# Patient Record
Sex: FEMALE | Race: BLACK OR AFRICAN AMERICAN | Employment: UNEMPLOYED | URBAN - METROPOLITAN AREA
[De-identification: names, ages, dates, MRNs, and addresses within clinical notes are randomized per-mention and may not be internally consistent; named-entity substitution may affect disease eponyms.]

---

## 2018-03-13 ENCOUNTER — HOSPITAL ENCOUNTER (EMERGENCY)
Age: 48
Discharge: HOME | End: 2018-03-13
Attending: EMERGENCY MEDICINE
Payer: MEDICAID

## 2018-03-13 ENCOUNTER — APPOINTMENT (OUTPATIENT)
Dept: ULTRASOUND IMAGING | Age: 48
End: 2018-03-13
Attending: PHYSICIAN ASSISTANT
Payer: MEDICAID

## 2018-03-13 ENCOUNTER — APPOINTMENT (OUTPATIENT)
Dept: CT IMAGING | Age: 48
End: 2018-03-13
Attending: PHYSICIAN ASSISTANT
Payer: MEDICAID

## 2018-03-13 VITALS
TEMPERATURE: 98.6 F | BODY MASS INDEX: 22.76 KG/M2 | RESPIRATION RATE: 16 BRPM | WEIGHT: 145 LBS | SYSTOLIC BLOOD PRESSURE: 126 MMHG | OXYGEN SATURATION: 100 % | HEART RATE: 72 BPM | HEIGHT: 67 IN | DIASTOLIC BLOOD PRESSURE: 90 MMHG

## 2018-03-13 DIAGNOSIS — N83.202 LEFT OVARIAN CYST: Primary | ICD-10-CM

## 2018-03-13 DIAGNOSIS — R10.31 ABDOMINAL PAIN, RIGHT LOWER QUADRANT: ICD-10-CM

## 2018-03-13 DIAGNOSIS — N30.01 ACUTE CYSTITIS WITH HEMATURIA: ICD-10-CM

## 2018-03-13 DIAGNOSIS — A59.01 TRICHOMONAL VAGINITIS: ICD-10-CM

## 2018-03-13 LAB
ALBUMIN SERPL-MCNC: 3.9 G/DL (ref 3.4–5)
ALBUMIN/GLOB SERPL: 0.8 {RATIO} (ref 0.8–1.7)
ALP SERPL-CCNC: 62 U/L (ref 45–117)
ALT SERPL-CCNC: 35 U/L (ref 13–56)
AMORPH CRY URNS QL MICRO: ABNORMAL
ANION GAP SERPL CALC-SCNC: 4 MMOL/L (ref 3–18)
APPEARANCE UR: ABNORMAL
AST SERPL-CCNC: 31 U/L (ref 15–37)
BACTERIA URNS QL MICRO: ABNORMAL /HPF
BASOPHILS # BLD: 0 K/UL (ref 0–0.06)
BASOPHILS NFR BLD: 0 % (ref 0–2)
BILIRUB SERPL-MCNC: 0.3 MG/DL (ref 0.2–1)
BILIRUB UR QL: NEGATIVE
BUN SERPL-MCNC: 7 MG/DL (ref 7–18)
BUN/CREAT SERPL: 10 (ref 12–20)
CALCIUM SERPL-MCNC: 9.1 MG/DL (ref 8.5–10.1)
CHLORIDE SERPL-SCNC: 103 MMOL/L (ref 100–108)
CO2 SERPL-SCNC: 31 MMOL/L (ref 21–32)
COLOR UR: YELLOW
CREAT SERPL-MCNC: 0.67 MG/DL (ref 0.6–1.3)
DIFFERENTIAL METHOD BLD: NORMAL
EOSINOPHIL # BLD: 0.2 K/UL (ref 0–0.4)
EOSINOPHIL NFR BLD: 4 % (ref 0–5)
EPITH CASTS URNS QL MICRO: ABNORMAL /LPF (ref 0–5)
ERYTHROCYTE [DISTWIDTH] IN BLOOD BY AUTOMATED COUNT: 12.6 % (ref 11.6–14.5)
GLOBULIN SER CALC-MCNC: 4.9 G/DL (ref 2–4)
GLUCOSE SERPL-MCNC: 94 MG/DL (ref 74–99)
GLUCOSE UR STRIP.AUTO-MCNC: NEGATIVE MG/DL
HCG UR QL: NEGATIVE
HCT VFR BLD AUTO: 41.2 % (ref 35–45)
HGB BLD-MCNC: 14.2 G/DL (ref 12–16)
HGB UR QL STRIP: ABNORMAL
KETONES UR QL STRIP.AUTO: NEGATIVE MG/DL
LEUKOCYTE ESTERASE UR QL STRIP.AUTO: ABNORMAL
LIPASE SERPL-CCNC: 221 U/L (ref 73–393)
LYMPHOCYTES # BLD: 1.8 K/UL (ref 0.9–3.6)
LYMPHOCYTES NFR BLD: 39 % (ref 21–52)
MCH RBC QN AUTO: 30.8 PG (ref 24–34)
MCHC RBC AUTO-ENTMCNC: 34.5 G/DL (ref 31–37)
MCV RBC AUTO: 89.4 FL (ref 74–97)
MONOCYTES # BLD: 0.5 K/UL (ref 0.05–1.2)
MONOCYTES NFR BLD: 10 % (ref 3–10)
MUCOUS THREADS URNS QL MICRO: ABNORMAL /LPF
NEUTS SEG # BLD: 2.2 K/UL (ref 1.8–8)
NEUTS SEG NFR BLD: 47 % (ref 40–73)
NITRITE UR QL STRIP.AUTO: NEGATIVE
PH UR STRIP: 8 [PH] (ref 5–8)
PLATELET # BLD AUTO: 242 K/UL (ref 135–420)
PMV BLD AUTO: 10.1 FL (ref 9.2–11.8)
POTASSIUM SERPL-SCNC: 3.7 MMOL/L (ref 3.5–5.5)
PROT SERPL-MCNC: 8.8 G/DL (ref 6.4–8.2)
PROT UR STRIP-MCNC: 30 MG/DL
RBC # BLD AUTO: 4.61 M/UL (ref 4.2–5.3)
RBC #/AREA URNS HPF: ABNORMAL /HPF (ref 0–5)
SERVICE CMNT-IMP: NORMAL
SODIUM SERPL-SCNC: 138 MMOL/L (ref 136–145)
SP GR UR REFRACTOMETRY: 1.02 (ref 1–1.03)
TRICHOMONAS UR QL MICRO: ABNORMAL
UROBILINOGEN UR QL STRIP.AUTO: 1 EU/DL (ref 0.2–1)
WBC # BLD AUTO: 4.7 K/UL (ref 4.6–13.2)
WBC URNS QL MICRO: ABNORMAL /HPF (ref 0–5)
WET PREP GENITAL: NORMAL

## 2018-03-13 PROCEDURE — 74177 CT ABD & PELVIS W/CONTRAST: CPT

## 2018-03-13 PROCEDURE — 74011250636 HC RX REV CODE- 250/636: Performed by: PHYSICIAN ASSISTANT

## 2018-03-13 PROCEDURE — 96374 THER/PROPH/DIAG INJ IV PUSH: CPT

## 2018-03-13 PROCEDURE — 74011636320 HC RX REV CODE- 636/320: Performed by: EMERGENCY MEDICINE

## 2018-03-13 PROCEDURE — 87491 CHLMYD TRACH DNA AMP PROBE: CPT | Performed by: PHYSICIAN ASSISTANT

## 2018-03-13 PROCEDURE — 87210 SMEAR WET MOUNT SALINE/INK: CPT | Performed by: PHYSICIAN ASSISTANT

## 2018-03-13 PROCEDURE — 74011000250 HC RX REV CODE- 250: Performed by: PHYSICIAN ASSISTANT

## 2018-03-13 PROCEDURE — 96372 THER/PROPH/DIAG INJ SC/IM: CPT

## 2018-03-13 PROCEDURE — 87086 URINE CULTURE/COLONY COUNT: CPT | Performed by: PHYSICIAN ASSISTANT

## 2018-03-13 PROCEDURE — 85025 COMPLETE CBC W/AUTO DIFF WBC: CPT | Performed by: PHYSICIAN ASSISTANT

## 2018-03-13 PROCEDURE — 96361 HYDRATE IV INFUSION ADD-ON: CPT

## 2018-03-13 PROCEDURE — 81001 URINALYSIS AUTO W/SCOPE: CPT | Performed by: PHYSICIAN ASSISTANT

## 2018-03-13 PROCEDURE — 80053 COMPREHEN METABOLIC PANEL: CPT | Performed by: PHYSICIAN ASSISTANT

## 2018-03-13 PROCEDURE — 99285 EMERGENCY DEPT VISIT HI MDM: CPT

## 2018-03-13 PROCEDURE — 76830 TRANSVAGINAL US NON-OB: CPT

## 2018-03-13 PROCEDURE — 74011250637 HC RX REV CODE- 250/637: Performed by: PHYSICIAN ASSISTANT

## 2018-03-13 PROCEDURE — 83690 ASSAY OF LIPASE: CPT | Performed by: PHYSICIAN ASSISTANT

## 2018-03-13 PROCEDURE — 81025 URINE PREGNANCY TEST: CPT

## 2018-03-13 RX ORDER — METRONIDAZOLE 500 MG/1
500 TABLET ORAL 2 TIMES DAILY
Qty: 14 TAB | Refills: 0 | Status: SHIPPED | OUTPATIENT
Start: 2018-03-13 | End: 2018-03-20

## 2018-03-13 RX ORDER — ONDANSETRON 4 MG/1
4 TABLET, ORALLY DISINTEGRATING ORAL
Qty: 12 TAB | Refills: 0 | Status: SHIPPED | OUTPATIENT
Start: 2018-03-13 | End: 2019-01-21

## 2018-03-13 RX ORDER — ONDANSETRON 2 MG/ML
4 INJECTION INTRAMUSCULAR; INTRAVENOUS
Status: COMPLETED | OUTPATIENT
Start: 2018-03-13 | End: 2018-03-13

## 2018-03-13 RX ORDER — CEPHALEXIN 500 MG/1
500 CAPSULE ORAL 2 TIMES DAILY
Qty: 14 CAP | Refills: 0 | Status: SHIPPED | OUTPATIENT
Start: 2018-03-13 | End: 2018-03-20

## 2018-03-13 RX ORDER — HYDROCODONE BITARTRATE AND ACETAMINOPHEN 5; 325 MG/1; MG/1
1-2 TABLET ORAL
Qty: 12 TAB | Refills: 0 | Status: SHIPPED | OUTPATIENT
Start: 2018-03-13 | End: 2019-01-21

## 2018-03-13 RX ORDER — AZITHROMYCIN 250 MG/1
1000 TABLET, FILM COATED ORAL
Status: COMPLETED | OUTPATIENT
Start: 2018-03-13 | End: 2018-03-13

## 2018-03-13 RX ORDER — METRONIDAZOLE 250 MG/1
500 TABLET ORAL
Status: COMPLETED | OUTPATIENT
Start: 2018-03-13 | End: 2018-03-13

## 2018-03-13 RX ADMIN — CEFTRIAXONE SODIUM 250 MG: 250 INJECTION, POWDER, FOR SOLUTION INTRAMUSCULAR; INTRAVENOUS at 12:56

## 2018-03-13 RX ADMIN — ONDANSETRON 4 MG: 2 INJECTION INTRAMUSCULAR; INTRAVENOUS at 12:54

## 2018-03-13 RX ADMIN — AZITHROMYCIN 1000 MG: 250 TABLET, FILM COATED ORAL at 13:05

## 2018-03-13 RX ADMIN — IOPAMIDOL 100 ML: 612 INJECTION, SOLUTION INTRAVENOUS at 13:30

## 2018-03-13 RX ADMIN — SODIUM CHLORIDE 1000 ML: 900 INJECTION, SOLUTION INTRAVENOUS at 12:51

## 2018-03-13 RX ADMIN — METRONIDAZOLE 500 MG: 250 TABLET ORAL at 13:05

## 2018-03-13 NOTE — DISCHARGE INSTRUCTIONS
Functional Ovarian Cyst: Care Instructions  Your Care Instructions    A functional ovarian cyst is a sac that forms on the surface of a woman's ovary during ovulation. The sac holds a maturing egg. Usually the sac goes away after the egg is released. But if the egg is not released, or if the sac closes up after the egg is released, the sac can swell up with fluid and form a cyst.  Functional ovarian cysts are different than ovarian growths caused by other problems, such as cancer. Most functional ovarian cysts cause no symptoms and go away on their own. Some cause mild pain. Others can cause severe pain when they rupture or bleed. Follow-up care is a key part of your treatment and safety. Be sure to make and go to all appointments, and call your doctor if you are having problems. It's also a good idea to know your test results and keep a list of the medicines you take. How can you care for yourself at home? · Use heat, such as a hot water bottle, a heating pad set on low, or a warm bath, to relax tense muscles and relieve cramping. · Be safe with medicines. Take pain medicines exactly as directed. ¨ If the doctor gave you a prescription medicine for pain, take it as prescribed. ¨ If you are not taking a prescription pain medicine, ask your doctor if you can take an over-the-counter medicine. · Avoid constipation. Make sure you drink enough fluids and include fruits, vegetables, and fiber in your diet each day. Constipation does not cause ovarian cysts, but it may make your pelvic pain worse. When should you call for help? Call your doctor now or seek immediate medical care if:  ? · You have severe vaginal bleeding. ? · You have new or worse belly or pelvic pain. ? Watch closely for changes in your health, and be sure to contact your doctor if:  ? · You have unusual vaginal bleeding. ? · You do not get better as expected. Where can you learn more?   Go to http://isacc-nash.info/. Enter P420 in the search box to learn more about \"Functional Ovarian Cyst: Care Instructions. \"  Current as of: October 13, 2016  Content Version: 11.4  © 5058-6356 Healthwise, [employer]. Care instructions adapted under license by [employer] (which disclaims liability or warranty for this information). If you have questions about a medical condition or this instruction, always ask your healthcare professional. [place] [address] any warranty or liability for your use of this information.

## 2018-03-13 NOTE — ED PROVIDER NOTES
EMERGENCY DEPARTMENT HISTORY AND PHYSICAL EXAM    Date: 3/13/2018  Patient Name: Austin Galicia    History of Presenting Illness     Chief Complaint   Patient presents with    Abdominal Pain         History Provided By: Patient    Chief Complaint: RLQ pain  Duration: 1 Days  Timing:  Acute  Location: RLQ  Quality: Cramping and Tightness  Severity: Moderate  Associated Symptoms: vomiting and back pain    Additional History (Context):   10:50 AM  Austin Galicia is a 41 y.o. female with PMHX of tubal pregnancy who presents to the emergency department C/O RLQ pain (cramping and tightness) onset yesterday. Associated sxs include vomiting and back pain. Pt is currently menstruating, 2 weeks earlier then normal. Pt was last sexually active several months ago. No at home tx. Pt denies appendectomy, fever, chills, vaginal discharge, irregular bowel movements and any other sxs or complaints. PCP: Jessie Carbone MD        Past History     Past Medical History:  Past Medical History:   Diagnosis Date    Bladder infection        Past Surgical History:  History reviewed. No pertinent surgical history. Family History:  History reviewed. No pertinent family history. Social History:  Social History   Substance Use Topics    Smoking status: Never Smoker    Smokeless tobacco: Never Used    Alcohol use No       Allergies: Allergies   Allergen Reactions    Phenergan [Promethazine] Itching     \"shakes\"         Review of Systems   Review of Systems   Constitutional: Negative for chills and fever. Respiratory: Negative for cough and shortness of breath. Cardiovascular: Negative for chest pain. Gastrointestinal: Positive for abdominal pain and vomiting. Negative for abdominal distention and nausea. Genitourinary: Positive for vaginal bleeding (on menses). Negative for difficulty urinating, dysuria, flank pain, hematuria, urgency and vaginal discharge. Musculoskeletal: Positive for back pain.  Negative for joint swelling. Skin: Negative for rash. Neurological: Negative for weakness and light-headedness. Hematological: Negative for adenopathy. All other systems reviewed and are negative. Physical Exam     Vitals:    03/13/18 1037 03/13/18 1307 03/13/18 1514   BP: (!) 161/108 (!) 161/95 126/90   Pulse: (!) 58 62 72   Resp: 16 18 16   Temp: 98.6 °F (37 °C)     SpO2: 100% 100% 100%   Weight: 65.8 kg (145 lb)     Height: 5' 7\" (1.702 m)       Physical Exam   Constitutional: She is oriented to person, place, and time. She appears well-developed and well-nourished. No distress. AA female in NAD. Alert. HENT:   Head: Normocephalic and atraumatic. Right Ear: External ear normal.   Left Ear: External ear normal.   Eyes: Conjunctivae are normal.   Neck: Normal range of motion. Cardiovascular: Normal rate, regular rhythm, normal heart sounds and intact distal pulses. Exam reveals no gallop and no friction rub. No murmur heard. Pulmonary/Chest: Effort normal and breath sounds normal. No accessory muscle usage. No tachypnea. No respiratory distress. She has no decreased breath sounds. She has no wheezes. She has no rhonchi. She has no rales. Abdominal: Soft. Normal appearance. She exhibits no ascites and no mass. There is tenderness in the right lower quadrant. There is no rigidity, no rebound, no guarding, no CVA tenderness and no tenderness at McBurney's point. Genitourinary:   Genitourinary Comments: Female chaperone in room. See pelvic procedure note. Verbal consent obtained prior to procedure   Neurological: She is alert and oriented to person, place, and time. Skin: Skin is warm and dry. She is not diaphoretic. Psychiatric: She has a normal mood and affect. Judgment normal.   Nursing note and vitals reviewed. Diagnostic Study Results     Labs -     No results found for this or any previous visit (from the past 12 hour(s)). Radiologic Studies -      US TRANSVAGINAL   Final Result   1. Unremarkable uterus and right ovary.     2. Complex left ovarian nodule (up to 1.5 cm) likely complex ovarian cyst.     3. Trace free pelvic fluid, thought to be physiologic. As read by the radiologist.      CT Results  (Last 48 hours)               03/13/18 1342  CT ABD PELV W CONT Final result    Impression:  IMPRESSION:       1. No acute intra-abdominal or pelvic abnormality demonstrated. The appendix is   visualized in its entirety and is normal in appearance. 2. Scattered colonic diverticula without evidence of diverticulitis. Narrative:  EXAM: CT of the abdomen and pelvis       INDICATION: 74-year-old patient with right lower quadrant abdominal pain       COMPARISON: None. TECHNIQUE: Axial CT imaging of the abdomen and pelvis was performed without oral   and with intravenous contrast. Multiplanar reformats were generated. One or more dose reduction techniques were used on this CT: automated exposure   control, adjustment of the mAs and/or kVp according to patient's size, and   iterative reconstruction techniques. The specific techniques utilized on this CT   exam have been documented in the patient's electronic medical record.   _______________       FINDINGS:       LOWER CHEST: Unremarkable. LIVER, BILIARY: Liver is normal. No biliary dilation. Gallbladder is   unremarkable. PANCREAS: Normal.       SPLEEN: Normal.       ADRENALS: Normal.       KIDNEYS/URETERS/BLADDER: Renal parenchymal enhancement is symmetric. No   hydronephrosis or nephrolithiasis. No distal ureteral or bladder stone. PELVIC ORGANS: Uterus and adnexa have an unremarkable CT appearance       VASCULATURE: Minor aortobiiliac atherosclerotic calcification is present without   evidence of aneurysmal dilatation       LYMPH NODES: No enlarged lymph nodes. GASTROINTESTINAL TRACT: The stomach, small intestine, and large intestine are   normal in course and caliber. No bowel obstruction.  No free intraperitoneal gas. The appendix is visualized in its entirety and is normal. Scattered colonic   diverticula are present without evidence of diverticulitis. BONES: No acute or aggressive osseous abnormalities identified. OTHER: None.       _______________    As read by the radiologist.            CXR Results  (Last 48 hours)    None          Medications given in the ED-  Medications   sodium chloride 0.9 % bolus infusion 1,000 mL (0 mL IntraVENous IV Completed 3/13/18 1351)   ondansetron (ZOFRAN) injection 4 mg (4 mg IntraVENous Given 3/13/18 1254)   cefTRIAXone (ROCEPHIN) 250 mg in lidocaine (XYLOCAINE) 10 mg/mL (1 %) IM syringe (250 mg IntraMUSCular Given 3/13/18 1256)   azithromycin (ZITHROMAX) tablet 1,000 mg (1,000 mg Oral Given 3/13/18 1305)   metroNIDAZOLE (FLAGYL) tablet 500 mg (500 mg Oral Given 3/13/18 1305)   iopamidol (ISOVUE 300) 61 % contrast injection 100 mL (100 mL IntraVENous Given 3/13/18 1330)         Medical Decision Making   I am the first provider for this patient. I reviewed the vital signs, available nursing notes, past medical history, past surgical history, family history and social history. Vital Signs-Reviewed the patient's vital signs. Provider Notes (Medical Decision Making): appy, colitis, diverticulitis, UTI/pyelo, stone, ovarian cyst, torsion, TOA, STI/PID. Doubt dissection    Procedures:  Pelvic Exam  Date/Time: 3/13/2018 11:01 AM  Performed by: PA  Procedure duration:  15 minutes. Documented by: Francy Rojas PA-C. Exam assisted by:  Female chaperone in room. Type of exam performed: bimanual and speculum. External genitalia appearance: normal.    Vaginal exam:  bleeding. Bleeding: moderate  Cervical exam:  minimal cervical motion tenderness and os closed. Specimen(s) collected:  chlamydia, GC and vaginal culture. Bimanual exam:  right adenexal tenderness.     Patient tolerance: Patient tolerated the procedure well with no immediate complications              ED Course:   10:50 PM Initial assessment performed. The patients presenting problems have been discussed, and they are in agreement with the care plan formulated and outlined with them. I have encouraged them to ask questions as they arise throughout their visit. Diagnosis and Disposition     Afebrile. CT without evidence of appy. US reveals cyst. + trich. Will tx for GC/C and await cultures. UA c/w UTI. GYN FU. Reasons to RTED discussed with pt. All questions answered. Pt feels comfortable going home at this time. Pt expressed understanding and she agrees with plan. DISCHARGE NOTE:  2:00 PM  Jose So's  results have been reviewed with her. She has been counseled regarding her diagnosis, treatment, and plan. She verbally conveys understanding and agreement of the signs, symptoms, diagnosis, treatment and prognosis and additionally agrees to follow up as discussed. She also agrees with the care-plan and conveys that all of her questions have been answered. I have also provided discharge instructions for her that include: educational information regarding their diagnosis and treatment, and list of reasons why they would want to return to the ED prior to their follow-up appointment, should her condition change. She has been provided with education for proper emergency department utilization. CLINICAL IMPRESSION:    1. Left ovarian cyst    2. Abdominal pain, right lower quadrant    3. Trichomonal vaginitis    4. Acute cystitis with hematuria        PLAN:  1. D/C Home  2. Discharge Medication List as of 3/13/2018  2:03 PM      START taking these medications    Details   metroNIDAZOLE (FLAGYL) 500 mg tablet Take 1 Tab by mouth two (2) times a day for 7 days. First dose in ER. Next dose in 12 hours. Indications: trichomoniasis, Print, Disp-14 Tab, R-0      cephALEXin (KEFLEX) 500 mg capsule Take 1 Cap by mouth two (2) times a day for 7 days.  Indications: BACTERIAL URINARY TRACT INFECTION, Print, Disp-14 Cap, R-0      HYDROcodone-acetaminophen (NORCO) 5-325 mg per tablet Take 1-2 Tabs by mouth every four (4) hours as needed for Pain. Max Daily Amount: 12 Tabs., Print, Disp-12 Tab, R-0      ondansetron (ZOFRAN ODT) 4 mg disintegrating tablet Take 1 Tab by mouth every eight (8) hours as needed for Nausea (or vomiting.). Allow to dissolve on tongue, Print, Disp-12 Tab, R-0           3. Follow-up Information     Follow up With Details Comments Contact Info    Yokasta Hensley MD Schedule an appointment as soon as possible for a visit in 2 days or follow up with your  [address] EMERGENCY DEPT  If symptoms worsen [address] [address]  [phone]        _______________________________    Attestations: This note is prepared by Chidi Vanegas , acting as Scribe for [employer]. [employer], ADOLFO:  The scribe's documentation has been prepared under my direction and personally reviewed by me in its entirety.   I confirm that the note above accurately reflects all work, treatment, procedures, and medical decision making performed by me.  _______________________________

## 2018-03-13 NOTE — ED TRIAGE NOTES
Pt states 8/10 RLQ abdominal pain and vomiting onset yesterday. Describes it as cramping and constant, moving makes it worse. States it is unlike her typical cramping pains associated with her period. Pt did start her period yesterday and is soaking 2 pads an hour-states this is unusual for her. Pt unable to keep food and water down. Denies coffee grounds nor any blood in the emesis. Pt states unintentional weight loss over the period of 6 months of approximately 20 lbs. States she has urinary urgency that also started about 6 months ago.

## 2018-03-13 NOTE — ED TRIAGE NOTES
C/o right sided lower abd pain, vomiting, fever and cold sx's since yesterday. Sepsis Screening completed    (  )Patient meets SIRS criteria. ( x )Patient does not meet SIRS criteria.       SIRS Criteria is achieved when two or more of the following are present   Temperature < 96.8°F (36°C) or > 100.9°F (38.3°C)   Heart Rate > 90 beats per minute   Respiratory Rate > 20 breaths per minute   WBC count > 12,000 or <4,000 or > 10% bands

## 2018-03-13 NOTE — ED NOTES
Medicated per MAR. Pt denies vomiting since triage. Pt tolerated medications well. ABD pain 8/10. No further needs expressed, lights dimmed for comfort.

## 2018-03-13 NOTE — LETTER
[facility] [place] 
THE [facility] EMERGENCY DEPT 
[address] [id number] [phone] Work/School Note Date: 3/13/2018 To Whom It May concern: 
 
Ibrahima Bethea was seen and treated today in the emergency room by the following provider(s): 
Attending Provider: Allen Graf MD 
Physician Assistant: Milvia Patton PA-C. Ibrahima Bethea may return to work on 3/15/18. Sincerely, Fany New PA-C

## 2018-03-14 LAB
C TRACH RRNA SPEC QL NAA+PROBE: NEGATIVE
N GONORRHOEA RRNA SPEC QL NAA+PROBE: NEGATIVE
SPECIMEN SOURCE: NORMAL

## 2018-03-15 LAB
BACTERIA SPEC CULT: NORMAL
SERVICE CMNT-IMP: NORMAL

## 2019-01-21 ENCOUNTER — APPOINTMENT (OUTPATIENT)
Dept: ULTRASOUND IMAGING | Age: 49
End: 2019-01-21
Attending: EMERGENCY MEDICINE
Payer: MEDICAID

## 2019-01-21 ENCOUNTER — HOSPITAL ENCOUNTER (EMERGENCY)
Age: 49
Discharge: HOME | End: 2019-01-21
Attending: EMERGENCY MEDICINE
Payer: MEDICAID

## 2019-01-21 ENCOUNTER — APPOINTMENT (OUTPATIENT)
Dept: CT IMAGING | Age: 49
End: 2019-01-21
Attending: EMERGENCY MEDICINE
Payer: MEDICAID

## 2019-01-21 VITALS
WEIGHT: 160 LBS | BODY MASS INDEX: 25.71 KG/M2 | TEMPERATURE: 97.9 F | RESPIRATION RATE: 20 BRPM | HEART RATE: 61 BPM | SYSTOLIC BLOOD PRESSURE: 129 MMHG | HEIGHT: 66 IN | DIASTOLIC BLOOD PRESSURE: 77 MMHG

## 2019-01-21 DIAGNOSIS — K59.00 CONSTIPATION, UNSPECIFIED CONSTIPATION TYPE: ICD-10-CM

## 2019-01-21 DIAGNOSIS — N83.202 LEFT OVARIAN CYST: ICD-10-CM

## 2019-01-21 DIAGNOSIS — R11.10 VOMITING IN ADULT: Primary | ICD-10-CM

## 2019-01-21 LAB
ALBUMIN SERPL-MCNC: 3.5 G/DL (ref 3.4–5)
ALBUMIN/GLOB SERPL: 0.9 {RATIO} (ref 0.8–1.7)
ALP SERPL-CCNC: 51 U/L (ref 45–117)
ALT SERPL-CCNC: 39 U/L (ref 13–56)
ANION GAP SERPL CALC-SCNC: 3 MMOL/L (ref 3–18)
APPEARANCE UR: CLEAR
AST SERPL-CCNC: 31 U/L (ref 15–37)
BACTERIA URNS QL MICRO: ABNORMAL /HPF
BASOPHILS # BLD: 0 K/UL (ref 0–0.1)
BASOPHILS NFR BLD: 0 % (ref 0–2)
BILIRUB SERPL-MCNC: 0.3 MG/DL (ref 0.2–1)
BILIRUB UR QL: NEGATIVE
BUN SERPL-MCNC: 11 MG/DL (ref 7–18)
BUN/CREAT SERPL: 23 (ref 12–20)
CALCIUM SERPL-MCNC: 8.4 MG/DL (ref 8.5–10.1)
CHLORIDE SERPL-SCNC: 110 MMOL/L (ref 100–108)
CO2 SERPL-SCNC: 27 MMOL/L (ref 21–32)
COLOR UR: YELLOW
CREAT SERPL-MCNC: 0.48 MG/DL (ref 0.6–1.3)
DIFFERENTIAL METHOD BLD: ABNORMAL
EOSINOPHIL # BLD: 0.1 K/UL (ref 0–0.4)
EOSINOPHIL NFR BLD: 2 % (ref 0–5)
EPITH CASTS URNS QL MICRO: ABNORMAL /LPF (ref 0–5)
ERYTHROCYTE [DISTWIDTH] IN BLOOD BY AUTOMATED COUNT: 12.8 % (ref 11.6–14.5)
GLOBULIN SER CALC-MCNC: 3.9 G/DL (ref 2–4)
GLUCOSE SERPL-MCNC: 104 MG/DL (ref 74–99)
GLUCOSE UR STRIP.AUTO-MCNC: NEGATIVE MG/DL
HCG SERPL QL: NEGATIVE
HCT VFR BLD AUTO: 37.7 % (ref 35–45)
HEMOCCULT STL QL: NEGATIVE
HGB BLD-MCNC: 13 G/DL (ref 12–16)
HGB UR QL STRIP: ABNORMAL
KETONES UR QL STRIP.AUTO: NEGATIVE MG/DL
LEUKOCYTE ESTERASE UR QL STRIP.AUTO: NEGATIVE
LIPASE SERPL-CCNC: 351 U/L (ref 73–393)
LYMPHOCYTES # BLD: 2.1 K/UL (ref 0.9–3.6)
LYMPHOCYTES NFR BLD: 47 % (ref 21–52)
MCH RBC QN AUTO: 31 PG (ref 24–34)
MCHC RBC AUTO-ENTMCNC: 34.5 G/DL (ref 31–37)
MCV RBC AUTO: 90 FL (ref 74–97)
MONOCYTES # BLD: 0.2 K/UL (ref 0.05–1.2)
MONOCYTES NFR BLD: 5 % (ref 3–10)
NEUTS SEG # BLD: 2.1 K/UL (ref 1.8–8)
NEUTS SEG NFR BLD: 46 % (ref 40–73)
NITRITE UR QL STRIP.AUTO: NEGATIVE
PH UR STRIP: 7 [PH] (ref 5–8)
PLATELET # BLD AUTO: 235 K/UL (ref 135–420)
PMV BLD AUTO: 9.8 FL (ref 9.2–11.8)
POTASSIUM SERPL-SCNC: 3.9 MMOL/L (ref 3.5–5.5)
PROT SERPL-MCNC: 7.4 G/DL (ref 6.4–8.2)
PROT UR STRIP-MCNC: NEGATIVE MG/DL
RBC # BLD AUTO: 4.19 M/UL (ref 4.2–5.3)
RBC #/AREA URNS HPF: ABNORMAL /HPF (ref 0–5)
SODIUM SERPL-SCNC: 140 MMOL/L (ref 136–145)
SP GR UR REFRACTOMETRY: >1.03 (ref 1–1.03)
UROBILINOGEN UR QL STRIP.AUTO: 0.2 EU/DL (ref 0.2–1)
WBC # BLD AUTO: 4.5 K/UL (ref 4.6–13.2)
WBC URNS QL MICRO: ABNORMAL /HPF (ref 0–5)

## 2019-01-21 PROCEDURE — 76830 TRANSVAGINAL US NON-OB: CPT

## 2019-01-21 PROCEDURE — 84703 CHORIONIC GONADOTROPIN ASSAY: CPT

## 2019-01-21 PROCEDURE — 83690 ASSAY OF LIPASE: CPT

## 2019-01-21 PROCEDURE — 96361 HYDRATE IV INFUSION ADD-ON: CPT

## 2019-01-21 PROCEDURE — 99283 EMERGENCY DEPT VISIT LOW MDM: CPT

## 2019-01-21 PROCEDURE — 96374 THER/PROPH/DIAG INJ IV PUSH: CPT

## 2019-01-21 PROCEDURE — 82272 OCCULT BLD FECES 1-3 TESTS: CPT

## 2019-01-21 PROCEDURE — 74011250636 HC RX REV CODE- 250/636: Performed by: EMERGENCY MEDICINE

## 2019-01-21 PROCEDURE — 85025 COMPLETE CBC W/AUTO DIFF WBC: CPT

## 2019-01-21 PROCEDURE — 74011636320 HC RX REV CODE- 636/320: Performed by: EMERGENCY MEDICINE

## 2019-01-21 PROCEDURE — 80053 COMPREHEN METABOLIC PANEL: CPT

## 2019-01-21 PROCEDURE — 74177 CT ABD & PELVIS W/CONTRAST: CPT

## 2019-01-21 PROCEDURE — 81001 URINALYSIS AUTO W/SCOPE: CPT

## 2019-01-21 RX ORDER — METOCLOPRAMIDE HYDROCHLORIDE 5 MG/ML
10 INJECTION INTRAMUSCULAR; INTRAVENOUS
Status: COMPLETED | OUTPATIENT
Start: 2019-01-21 | End: 2019-01-21

## 2019-01-21 RX ORDER — DOCUSATE SODIUM 100 MG/1
100 CAPSULE, LIQUID FILLED ORAL 2 TIMES DAILY
Qty: 60 CAP | Refills: 2 | Status: SHIPPED | OUTPATIENT
Start: 2019-01-21 | End: 2019-04-21

## 2019-01-21 RX ORDER — ONDANSETRON 8 MG/1
8 TABLET, ORALLY DISINTEGRATING ORAL
Qty: 12 TAB | Refills: 0 | OUTPATIENT
Start: 2019-01-21 | End: 2021-10-15

## 2019-01-21 RX ORDER — POLYETHYLENE GLYCOL 3350 17 G/17G
17 POWDER, FOR SOLUTION ORAL 2 TIMES DAILY
Qty: 238 G | Refills: 0 | Status: SHIPPED | OUTPATIENT
Start: 2019-01-21

## 2019-01-21 RX ORDER — IBUPROFEN 800 MG/1
800 TABLET ORAL EVERY 8 HOURS
Qty: 15 TAB | Refills: 0 | Status: SHIPPED | OUTPATIENT
Start: 2019-01-21 | End: 2019-01-26

## 2019-01-21 RX ADMIN — IOPAMIDOL 100 ML: 612 INJECTION, SOLUTION INTRAVENOUS at 10:40

## 2019-01-21 RX ADMIN — SODIUM CHLORIDE 1000 ML: 900 INJECTION, SOLUTION INTRAVENOUS at 09:11

## 2019-01-21 RX ADMIN — METOCLOPRAMIDE 10 MG: 5 INJECTION, SOLUTION INTRAMUSCULAR; INTRAVENOUS at 09:11

## 2019-01-21 NOTE — DISCHARGE INSTRUCTIONS
Patient Education        Nausea and Vomiting: Care Instructions  Your Care Instructions    When you are nauseated, you may feel weak and sweaty and notice a lot of saliva in your mouth. Nausea often leads to vomiting. Most of the time you do not need to worry about nausea and vomiting, but they can be signs of other illnesses. Two common causes of nausea and vomiting are stomach flu and food poisoning. Nausea and vomiting from viral stomach flu will usually start to improve within 24 hours. Nausea and vomiting from food poisoning may last from 12 to 48 hours. The doctor has checked you carefully, but problems can develop later. If you notice any problems or new symptoms, get medical treatment right away. Follow-up care is a key part of your treatment and safety. Be sure to make and go to all appointments, and call your doctor if you are having problems. It's also a good idea to know your test results and keep a list of the medicines you take. How can you care for yourself at home? · To prevent dehydration, drink plenty of fluids, enough so that your urine is light yellow or clear like water. Choose water and other caffeine-free clear liquids until you feel better. If you have kidney, heart, or liver disease and have to limit fluids, talk with your doctor before you increase the amount of fluids you drink. · Rest in bed until you feel better. · When you are able to eat, try clear soups, mild foods, and liquids until all symptoms are gone for 12 to 48 hours. Other good choices include dry toast, crackers, cooked cereal, and gelatin dessert, such as Jell-O. When should you call for help? Call 911 anytime you think you may need emergency care. For example, call if:    · You passed out (lost consciousness).    Call your doctor now or seek immediate medical care if:    · You have symptoms of dehydration, such as:  ? Dry eyes and a dry mouth. ? Passing only a little dark urine. ?  Feeling thirstier than usual.   · You have new or worsening belly pain.     · You have a new or higher fever.     · You vomit blood or what looks like coffee grounds.    Watch closely for changes in your health, and be sure to contact your doctor if:    · You have ongoing nausea and vomiting.     · Your vomiting is getting worse.     · Your vomiting lasts longer than 2 days.     · You are not getting better as expected. Where can you learn more? Go to http://isacc-nash.info/. Enter 25 [id number] in the search box to learn more about \"Nausea and Vomiting: Care Instructions. \"  Current as of: September 23, 2018  Content Version: 11.9  © 1138-8059 [employer]. Care instructions adapted under license by [employer] (which disclaims liability or warranty for this information). If you have questions about a medical condition or this instruction, always ask your healthcare professional. [place] [address] any warranty or liability for your use of this information. Patient Education        Constipation: Care Instructions  Your Care Instructions    Constipation means that you have a hard time passing stools (bowel movements). People pass stools from 3 times a day to once every 3 days. What is normal for you may be different. Constipation may occur with pain in the rectum and cramping. The pain may get worse when you try to pass stools. Sometimes there are small amounts of bright red blood on toilet paper or the surface of stools. This is because of enlarged veins near the rectum (hemorrhoids). A few changes in your diet and lifestyle may help you avoid ongoing constipation. Your doctor may also prescribe medicine to help loosen your stool. Some medicines can cause constipation. These include pain medicines and antidepressants. Tell your doctor about all the medicines you take. Your doctor may want to make a medicine change to ease your symptoms.   Follow-up care is a key part of your treatment and safety. Be sure to make and go to all appointments, and call your doctor if you are having problems. It's also a good idea to know your test results and keep a list of the medicines you take. How can you care for yourself at home? · Drink plenty of fluids, enough so that your urine is light yellow or clear like water. If you have kidney, heart, or liver disease and have to limit fluids, talk with your doctor before you increase the amount of fluids you drink. · Include high-fiber foods in your diet each day. These include fruits, vegetables, beans, and whole grains. · Get at least 30 minutes of exercise on most days of the week. Walking is a good choice. You also may want to do other activities, such as running, swimming, cycling, or playing tennis or team sports. · Take a fiber supplement, such as Citrucel or Metamucil, every day. Read and follow all instructions on the label. · Schedule time each day for a bowel movement. A daily routine may help. Take your time having your bowel movement. · Support your feet with a small step stool when you sit on the toilet. This helps flex your hips and places your pelvis in a squatting position. · Your doctor may recommend an over-the-counter laxative to relieve your constipation. Examples are Milk of Magnesia and MiraLax. Read and follow all instructions on the label. Do not use laxatives on a long-term basis. When should you call for help? Call your doctor now or seek immediate medical care if:    · You have new or worse belly pain.     · You have new or worse nausea or vomiting.     · You have blood in your stools.    Watch closely for changes in your health, and be sure to contact your doctor if:    · Your constipation is getting worse.     · You do not get better as expected. Where can you learn more? Go to http://isacc-nash.info/. Enter 21 [id number] in the search box to learn more about \"Constipation: Care Instructions. \"  Current as of: September 23, 2018  Content Version: 11.9  © 3970-4537 [employer]. Care instructions adapted under license by [employer] (which disclaims liability or warranty for this information). If you have questions about a medical condition or this instruction, always ask your healthcare professional. [address] any warranty or liability for your use of this information. Patient Education        Functional Ovarian Cyst: Care Instructions  Your Care Instructions    A functional ovarian cyst is a sac that forms on the surface of a woman's ovary during ovulation. The sac holds a maturing egg. Usually the sac goes away after the egg is released. But if the egg is not released, or if the sac closes up after the egg is released, the sac can swell up with fluid and form a cyst.  Functional ovarian cysts are different than ovarian growths caused by other problems, such as cancer. Most functional ovarian cysts cause no symptoms and go away on their own. Some cause mild pain. Others can cause severe pain when they rupture or bleed. Follow-up care is a key part of your treatment and safety. Be sure to make and go to all appointments, and call your doctor if you are having problems. It's also a good idea to know your test results and keep a list of the medicines you take. How can you care for yourself at home? · Use heat, such as a hot water bottle, a heating pad set on low, or a warm bath, to relax tense muscles and relieve cramping. · Be safe with medicines. Take pain medicines exactly as directed. ? If the doctor gave you a prescription medicine for pain, take it as prescribed. ? If you are not taking a prescription pain medicine, ask your doctor if you can take an over-the-counter medicine. · Avoid constipation. Make sure you drink enough fluids and include fruits, vegetables, and fiber in your diet each day.  Constipation does not cause ovarian cysts, but it may make your pelvic pain worse. When should you call for help? Call your doctor now or seek immediate medical care if:    · You have severe vaginal bleeding.     · You have new or worse belly or pelvic pain.    Watch closely for changes in your health, and be sure to contact your doctor if:    · You have unusual vaginal bleeding.     · You do not get better as expected. Where can you learn more? Go to http://isacc-nash.info/. Enter Q792 in the search box to learn more about \"Functional Ovarian Cyst: Care Instructions. \"  Current as of: May 14, 2018  Content Version: 11.9  © 8488-7979 [employer]. Care instructions adapted under license by [employer] (which disclaims liability or warranty for this information). If you have questions about a medical condition or this instruction, always ask your healthcare professional. [address] any warranty or liability for your use of this information.

## 2019-01-21 NOTE — ED NOTES
Patient unable to provide urine specimen at this time. Fluid bolus infusing. Patient advised to utilize call bell if need to urinate arises or she requires assistance.

## 2019-01-21 NOTE — ED TRIAGE NOTES
Patient with complaints of vomiting for the last 7 days. Patient also reports hard stool with rectal bleeding.

## 2019-01-21 NOTE — ED NOTES
Patient asked to removed clothing from the waist down and provided with warm blankets at this time. Patient has a steady gait and shows no signs of acute distress.

## 2019-01-21 NOTE — ED PROVIDER NOTES
EMERGENCY DEPARTMENT HISTORY AND PHYSICAL EXAM 
 
Date: 1/21/2019 Patient Name: Lin Pinon History of Presenting Illness No chief complaint on file. History Provided By: Patient Chief Complaint: Vomiting Duration: 1 Weeks Timing:  Acute Modifying Factors: Pt has taken Tylenol with no relief Associated Symptoms: blood in stool, lower cramping abd pain and nausea Additional History (Context):  
8:51 AM 
Lin Pinon is a 57 y.o. female who presents to the emergency department C/O vomiting onset 1 week ago. Associated sxs include blood in stool (onset 3 days ago before her menstrual period started), lower cramping abd pain and nausea. Pt has been taking Tylenol and last took it 6 hours ago. Pt notes she last had food stay down 2 days ago. Pt notes she had a recent mammogram and her doctors found something on it, and she has an appointment to see Dr. Justine Simmons tomorrow. Pt notes she is currently on her menstrual period. Pt last urinated this morning and was normal. Pt notes she normally has constipation. Pt denies any PMHx, tobacco use, drug use, alcohol use, rectal bleeding, and any other sxs or complaints. PCP: Olive Chavira MD 
 
 
 
Past History Past Medical History: 
Past Medical History:  
Diagnosis Date  Bladder infection Past Surgical History: 
History reviewed. No pertinent surgical history. Family History: 
History reviewed. No pertinent family history. Social History: 
Social History Tobacco Use  Smoking status: Never Smoker  Smokeless tobacco: Never Used Substance Use Topics  Alcohol use: No  
 Drug use: No  
 
 
Allergies: Allergies Allergen Reactions  Phenergan [Promethazine] Itching \"shakes\" Review of Systems Review of Systems Gastrointestinal: Positive for blood in stool, nausea and vomiting. Negative for anal bleeding. All other systems reviewed and are negative. Physical Exam  
 
Vitals: 01/21/19 3331 BP: 129/77 Pulse: 61 Resp: 20 Temp: 97.9 °F (36.6 °C) Weight: 72.6 kg (160 lb) Height: 5' 6\" (1.676 m) Physical Exam  
Nursing note and vitals reviewed. Constitutional: Well appearing, no acute distress Head: Normocephalic, Atraumatic Eyes: EOMI Neck: Supple Cardiovascular: Regular rate and rhythm, no murmurs, rubs, or gallops Chest: Normal work of breathing and chest excursion bilaterally Lungs: Clear to ausculation bilaterally Abdomen: Soft, tender across lower abdomen but otherwise non-tender without rebound or guarding, non distended, normoactive bowel sounds Back: No evidence of trauma or deformity Extremities: No evidence of trauma or deformity, no LE edema Skin: Warm and dry, normal cap refill Neuro: Alert and appropriate Psychiatric: Normal mood and affect Diagnostic Study Results Labs - Recent Results (from the past 12 hour(s)) OCCULT BLOOD, STOOL Collection Time: 01/21/19  8:53 AM  
Result Value Ref Range Occult blood, stool NEGATIVE  NEG    
CBC WITH AUTOMATED DIFF Collection Time: 01/21/19  8:55 AM  
Result Value Ref Range WBC 4.5 (L) 4.6 - 13.2 K/uL  
 RBC 4.19 (L) 4.20 - 5.30 M/uL  
 HGB 13.0 12.0 - 16.0 g/dL HCT 37.7 35.0 - 45.0 % MCV 90.0 74.0 - 97.0 FL  
 MCH 31.0 24.0 - 34.0 PG  
 MCHC 34.5 31.0 - 37.0 g/dL  
 RDW 12.8 11.6 - 14.5 % PLATELET 762 961 - 214 K/uL MPV 9.8 9.2 - 11.8 FL  
 NEUTROPHILS 46 40 - 73 % LYMPHOCYTES 47 21 - 52 % MONOCYTES 5 3 - 10 % EOSINOPHILS 2 0 - 5 % BASOPHILS 0 0 - 2 %  
 ABS. NEUTROPHILS 2.1 1.8 - 8.0 K/UL  
 ABS. LYMPHOCYTES 2.1 0.9 - 3.6 K/UL  
 ABS. MONOCYTES 0.2 0.05 - 1.2 K/UL  
 ABS. EOSINOPHILS 0.1 0.0 - 0.4 K/UL  
 ABS. BASOPHILS 0.0 0.0 - 0.1 K/UL  
 DF AUTOMATED METABOLIC PANEL, COMPREHENSIVE Collection Time: 01/21/19  8:55 AM  
Result Value Ref Range Sodium 140 136 - 145 mmol/L Potassium 3.9 3.5 - 5.5 mmol/L  Chloride 110 (H) 100 - 108 mmol/L  
 CO2 27 21 - 32 mmol/L Anion gap 3 3.0 - 18 mmol/L Glucose 104 (H) 74 - 99 mg/dL BUN 11 7.0 - 18 MG/DL Creatinine 0.48 (L) 0.6 - 1.3 MG/DL  
 BUN/Creatinine ratio 23 (H) 12 - 20 GFR est AA >60 >60 ml/min/1.73m2 GFR est non-AA >60 >60 ml/min/1.73m2 Calcium 8.4 (L) 8.5 - 10.1 MG/DL Bilirubin, total 0.3 0.2 - 1.0 MG/DL  
 ALT (SGPT) 39 13 - 56 U/L  
 AST (SGOT) 31 15 - 37 U/L Alk. phosphatase 51 45 - 117 U/L Protein, total 7.4 6.4 - 8.2 g/dL Albumin 3.5 3.4 - 5.0 g/dL Globulin 3.9 2.0 - 4.0 g/dL A-G Ratio 0.9 0.8 - 1.7 LIPASE Collection Time: 01/21/19  8:55 AM  
Result Value Ref Range Lipase 351 73 - 393 U/L  
HCG QL SERUM Collection Time: 01/21/19  8:55 AM  
Result Value Ref Range HCG, Ql. NEGATIVE  NEG    
URINALYSIS W/ RFLX MICROSCOPIC Collection Time: 01/21/19 11:53 AM  
Result Value Ref Range Color YELLOW Appearance CLEAR Specific gravity >1.030 (H) 1.005 - 1.030  
 pH (UA) 7.0 5.0 - 8.0 Protein NEGATIVE  NEG mg/dL Glucose NEGATIVE  NEG mg/dL Ketone NEGATIVE  NEG mg/dL Bilirubin NEGATIVE  NEG Blood LARGE (A) NEG Urobilinogen 0.2 0.2 - 1.0 EU/dL Nitrites NEGATIVE  NEG Leukocyte Esterase NEGATIVE  NEG Radiologic Studies -  
US TRANSVAGINAL Final Result IMPRESSION:  
  
1. Normal blood flow to each ovary without features to suggest ovarian torsion. 2. Complex left ovarian nodule with internal sonographic features suggesting a  
small hemorrhagic cyst.  
As read by the radiologist.  
CT ABD PELV W CONT Final Result IMPRESSION:  
  
1. Normal CT appearance of the small and large bowel. Normal appendix. No  
abnormal rectal wall thickening demonstrated. 2. Small rim-enhancing right adnexal structure favored to represent a  
physiologic or functional ovarian cyst.  
As read by the radiologist.  
  
 
CT Results  (Last 48 hours) 01/21/19 1050  CT ABD PELV W CONT Final result Impression:  IMPRESSION:  
   
1. Normal CT appearance of the small and large bowel. Normal appendix. No  
abnormal rectal wall thickening demonstrated. 2. Small rim-enhancing right adnexal structure favored to represent a  
physiologic or functional ovarian cyst.  
   
  
 Narrative:  EXAM: CT of the abdomen and pelvis INDICATION: Rectal bleeding, pelvic pain COMPARISON: CT performed March 13, 2018 TECHNIQUE: Axial CT imaging of the abdomen and pelvis was performed without oral  
and with intravenous contrast. Multiplanar reformats were generated. One or more dose reduction techniques were used on this CT: automated exposure  
control, adjustment of the mAs and/or kVp according to patient size, and  
iterative reconstruction techniques. The specific techniques used on this CT  
exam have been documented in the patient's electronic medical record. Digital  
Imaging and Communications in Medicine (DICOM) format image data are available  
to nonaffiliated external healthcare facilities or entities on a secure, media  
free, reciprocally searchable basis with patient authorization for at least a  
12-month period after this study. _______________ FINDINGS:  
   
LOWER CHEST: Unremarkable. LIVER, BILIARY: Liver is normal. No biliary dilation. Gallbladder is  
unremarkable. PANCREAS: Normal.  
   
SPLEEN: Normal.  
   
ADRENALS: Normal.  
   
KIDNEYS/URETERS/BLADDER: Normal.  
   
PELVIC ORGANS: Mild heterogeneous uterine enhancement noted with trace volume  
pelvic fluid. Small rim-enhancing right adnexal structure estimated at  
approximately 1.5 x 2.0 cm. VASCULATURE: Unremarkable LYMPH NODES: No enlarged lymph nodes. GASTROINTESTINAL TRACT: No bowel dilation or wall thickening. Normal appendix. BONES: No acute or aggressive osseous abnormalities identified. OTHER: None.  
   
_______________ CXR Results  (Last 48 hours) None Medications given in the ED- Medications  
sodium chloride 0.9 % bolus infusion 1,000 mL (not administered)  
sodium chloride 0.9 % bolus infusion 1,000 mL (1,000 mL IntraVENous New Bag 1/21/19 0911)  
metoclopramide HCl (REGLAN) injection 10 mg (10 mg IntraVENous Given 1/21/19 0911) iopamidol (ISOVUE 300) 61 % contrast injection 100 mL (100 mL IntraVENous Given 1/21/19 1040) Medical Decision Making I am the first provider for this patient. I reviewed the vital signs, available nursing notes, past medical history, past surgical history, family history and social history. Vital Signs-Reviewed the patient's vital signs. Records Reviewed: Nursing Notes Provider Notes (Medical Decision Making):  
 
Procedures: 
Procedures PROCEDURE NOTE - RECTAL EXAM:  
8:55 AM 
Performed by: Henry Mercado MD 
Rectal exam performed. Light brown stool was collected. Stool was Hemoccult tested and sent to the lab The procedure took 1-15 minutes, and pt tolerated well. Written by Nathalia Leroy, ED Scribe, as dictated by Henry Mercado MD. 
 
 
ED Course:  
8:51 AM  
Initial assessment performed. The patients presenting problems have been discussed, and they are in agreement with the care plan formulated and outlined with them. I have encouraged them to ask questions as they arise throughout their visit. Discussion: 56 y/o female complaining of 1 week of vomiting, hard stool with some blood and abd pain. Only acute abnormality identified is ovarian cyst without evidence of torsion. Hx concerning for constipation. Will start bowel regimen and discharge with symptom management, ob/gyn and PCP follow up and return precautions. Pt understands and agrees with this plan. Diagnosis and Disposition DISCHARGE NOTE: 
12:50 PM 
Jose So's  results have been reviewed with her.   She has been counseled regarding her diagnosis, treatment, and plan. She verbally conveys understanding and agreement of the signs, symptoms, diagnosis, treatment and prognosis and additionally agrees to follow up as discussed. She also agrees with the care-plan and conveys that all of her questions have been answered. I have also provided discharge instructions for her that include: educational information regarding their diagnosis and treatment, and list of reasons why they would want to return to the ED prior to their follow-up appointment, should her condition change. She has been provided with education for proper emergency department utilization. CLINICAL IMPRESSION: 
 
1. Vomiting in adult 2. Constipation, unspecified constipation type 3. Left ovarian cyst   
 
 
PLAN: 
1. D/C Home 2. Current Discharge Medication List  
  
START taking these medications Details  
polyethylene glycol (MIRALAX) 17 gram/dose powder Take 17 g by mouth two (2) times a day. 1 tablespoon with 8 oz of water daily 
Qty: 238 g, Refills: 0  
  
docusate sodium (COLACE) 100 mg capsule Take 1 Cap by mouth two (2) times a day for 90 days. Qty: 60 Cap, Refills: 2  
  
ibuprofen (MOTRIN) 800 mg tablet Take 1 Tab by mouth every eight (8) hours for 5 days. Qty: 15 Tab, Refills: 0  
  
ondansetron (ZOFRAN ODT) 8 mg disintegrating tablet Take 1 Tab by mouth every eight (8) hours as needed for Nausea for up to 12 doses. Qty: 12 Tab, Refills: 0  
  
  
 
3. Follow-up Information Follow up With Specialties Details Why Contact Info Yamilka Borrero MD Internal Medicine Schedule an appointment as soon as possible for a visit in 3 days For primary care follow up [address] [facility] [address] [facility] 
[phone] Edith Yee MD Obstetrics & Gynecology, Gynecology, Obstetrics Schedule an appointment as soon as possible for a visit in 3 days For ob/gyn follow up [address] [place], [place] [id number] [phone] [facility] EMERGENCY DEPT Emergency Medicine Go to As needed, if symptoms worsen [address] [facility] [address] 
[phone]  
  
 
_______________________________ Attestations: This note is prepared by Jeane Hernandez, acting as Scribe for Cornelius Aguilar MD. Cornelius Aguilar MD:  The scribe's documentation has been prepared under my direction and personally reviewed by me in its entirety. I confirm that the note above accurately reflects all work, treatment, procedures, and medical decision making performed by me. 
_______________________________

## 2019-01-21 NOTE — LETTER
[facility] [place] 
[facility] 
[address] [address] 
[phone] Work/School Note Date: 1/21/2019 To Whom It May concern: 
 
Joseph Mir was seen and treated today in the emergency room by the following provider(s): 
Attending Provider: Bertrand Melendrez MD.   
 
Joseph Mir may return to work on 1/23/2019.  
 
Sincerely, 
 
 
 
 
Ramya Hernandez MD

## 2019-10-24 ENCOUNTER — HOSPITAL ENCOUNTER (EMERGENCY)
Age: 49
Discharge: HOME | End: 2019-10-24
Attending: EMERGENCY MEDICINE
Payer: MEDICAID

## 2019-10-24 VITALS
SYSTOLIC BLOOD PRESSURE: 110 MMHG | DIASTOLIC BLOOD PRESSURE: 76 MMHG | BODY MASS INDEX: 25.11 KG/M2 | OXYGEN SATURATION: 100 % | RESPIRATION RATE: 15 BRPM | HEIGHT: 67 IN | WEIGHT: 160 LBS | HEART RATE: 70 BPM | TEMPERATURE: 98.8 F

## 2019-10-24 DIAGNOSIS — R42 VERTIGO: ICD-10-CM

## 2019-10-24 DIAGNOSIS — K62.5 BRBPR (BRIGHT RED BLOOD PER RECTUM): Primary | ICD-10-CM

## 2019-10-24 LAB
ALBUMIN SERPL-MCNC: 3.4 G/DL (ref 3.4–5)
ALBUMIN/GLOB SERPL: 0.9 {RATIO} (ref 0.8–1.7)
ALP SERPL-CCNC: 50 U/L (ref 45–117)
ALT SERPL-CCNC: 39 U/L (ref 13–56)
ANION GAP SERPL CALC-SCNC: 5 MMOL/L (ref 3–18)
APPEARANCE UR: CLEAR
AST SERPL-CCNC: 28 U/L (ref 10–38)
BACTERIA URNS QL MICRO: ABNORMAL /HPF
BASOPHILS # BLD: 0 K/UL (ref 0–0.1)
BASOPHILS NFR BLD: 0 % (ref 0–2)
BILIRUB SERPL-MCNC: 0.5 MG/DL (ref 0.2–1)
BILIRUB UR QL: NEGATIVE
BUN SERPL-MCNC: 7 MG/DL (ref 7–18)
BUN/CREAT SERPL: 12 (ref 12–20)
CALCIUM SERPL-MCNC: 8.1 MG/DL (ref 8.5–10.1)
CHLORIDE SERPL-SCNC: 108 MMOL/L (ref 100–111)
CK MB CFR SERPL CALC: NORMAL % (ref 0–4)
CK MB SERPL-MCNC: <1 NG/ML (ref 5–25)
CK SERPL-CCNC: 125 U/L (ref 26–192)
CO2 SERPL-SCNC: 26 MMOL/L (ref 21–32)
COLOR UR: YELLOW
CREAT SERPL-MCNC: 0.59 MG/DL (ref 0.6–1.3)
DIFFERENTIAL METHOD BLD: NORMAL
EOSINOPHIL # BLD: 0.1 K/UL (ref 0–0.4)
EOSINOPHIL NFR BLD: 2 % (ref 0–5)
EPITH CASTS URNS QL MICRO: ABNORMAL /LPF (ref 0–5)
ERYTHROCYTE [DISTWIDTH] IN BLOOD BY AUTOMATED COUNT: 12.7 % (ref 11.6–14.5)
GLOBULIN SER CALC-MCNC: 4 G/DL (ref 2–4)
GLUCOSE SERPL-MCNC: 126 MG/DL (ref 74–99)
GLUCOSE UR STRIP.AUTO-MCNC: NEGATIVE MG/DL
HCT VFR BLD AUTO: 37.8 % (ref 35–45)
HEMOCCULT STL QL: POSITIVE
HGB BLD-MCNC: 13.3 G/DL (ref 12–16)
HGB UR QL STRIP: NEGATIVE
KETONES UR QL STRIP.AUTO: NEGATIVE MG/DL
LEUKOCYTE ESTERASE UR QL STRIP.AUTO: ABNORMAL
LYMPHOCYTES # BLD: 2.9 K/UL (ref 0.9–3.6)
LYMPHOCYTES NFR BLD: 50 % (ref 21–52)
MCH RBC QN AUTO: 31.3 PG (ref 24–34)
MCHC RBC AUTO-ENTMCNC: 35.2 G/DL (ref 31–37)
MCV RBC AUTO: 88.9 FL (ref 74–97)
MONOCYTES # BLD: 0.5 K/UL (ref 0.05–1.2)
MONOCYTES NFR BLD: 8 % (ref 3–10)
MUCOUS THREADS URNS QL MICRO: ABNORMAL /LPF
NEUTS SEG # BLD: 2.4 K/UL (ref 1.8–8)
NEUTS SEG NFR BLD: 40 % (ref 40–73)
NITRITE UR QL STRIP.AUTO: NEGATIVE
PH UR STRIP: 5.5 [PH] (ref 5–8)
PLATELET # BLD AUTO: 223 K/UL (ref 135–420)
PMV BLD AUTO: 9.7 FL (ref 9.2–11.8)
POTASSIUM SERPL-SCNC: 3.6 MMOL/L (ref 3.5–5.5)
PROT SERPL-MCNC: 7.4 G/DL (ref 6.4–8.2)
PROT UR STRIP-MCNC: NEGATIVE MG/DL
RBC # BLD AUTO: 4.25 M/UL (ref 4.2–5.3)
RBC #/AREA URNS HPF: ABNORMAL /HPF (ref 0–5)
SODIUM SERPL-SCNC: 139 MMOL/L (ref 136–145)
SP GR UR REFRACTOMETRY: 1.02 (ref 1–1.03)
TROPONIN I SERPL-MCNC: <0.02 NG/ML (ref 0–0.04)
UROBILINOGEN UR QL STRIP.AUTO: 1 EU/DL (ref 0.2–1)
WBC # BLD AUTO: 5.9 K/UL (ref 4.6–13.2)
WBC URNS QL MICRO: ABNORMAL /HPF (ref 0–5)

## 2019-10-24 PROCEDURE — 99285 EMERGENCY DEPT VISIT HI MDM: CPT

## 2019-10-24 PROCEDURE — 80053 COMPREHEN METABOLIC PANEL: CPT

## 2019-10-24 PROCEDURE — 85025 COMPLETE CBC W/AUTO DIFF WBC: CPT

## 2019-10-24 PROCEDURE — 82272 OCCULT BLD FECES 1-3 TESTS: CPT

## 2019-10-24 PROCEDURE — 81001 URINALYSIS AUTO W/SCOPE: CPT

## 2019-10-24 PROCEDURE — 82550 ASSAY OF CK (CPK): CPT

## 2019-10-24 PROCEDURE — 93005 ELECTROCARDIOGRAM TRACING: CPT

## 2019-10-24 RX ORDER — MECLIZINE HYDROCHLORIDE 25 MG/1
25 TABLET ORAL
Qty: 21 TAB | Refills: 0 | Status: SHIPPED | OUTPATIENT
Start: 2019-10-24 | End: 2019-10-31

## 2019-10-24 NOTE — ED TRIAGE NOTES
Patient reports to ED c/c dark colored blood in stool onset tonight. Pt also c/o dizziness onset 1 year ago, reports hx of vertigo.

## 2019-10-24 NOTE — ED PROVIDER NOTES
EMERGENCY DEPARTMENT HISTORY AND PHYSICAL EXAM    Date: 10/24/2019  Patient Name: Nicole Antony    History of Presenting Illness     Chief Complaint   Patient presents with    Melena    Dizziness         History Provided By: Patient    12:48 AM  Nicole Antony is a 54 y.o. female with PMHX of HTN, not on meds who presents to the emergency department C/O bloody stool with passage of blood clots from rectum with stool x 2 today. Blood is medium-dark red, not black. History of passing blood per rectum in January of this year for which she was evaluated in the ED and had negative CT abd/pelvis but symptoms resolved and did not recur until today. Patient also reports history of vertigo, described as spinning sensation that is worse with movement of her head, ongoing and intermittent for over a year. States it has been worse for the past 2 months since riding roller coasters at [employer]. She has only taken Tylenol for the vertigo. Has never had a colonoscopy. Pt denies fever, abdominal pain, dysuria, hematuria, vaginal bleeding, back pain, personal or family history of colon cancer, history of Crohn's or ulcerative colitis, and any other sxs or complaints. PCP: Javier Mata MD    Current Outpatient Medications   Medication Sig Dispense Refill    meclizine (ANTIVERT) 25 mg tablet Take 1 Tab by mouth three (3) times daily as needed for Dizziness for up to 7 days. 21 Tab 0    polyethylene glycol (MIRALAX) 17 gram/dose powder Take 17 g by mouth two (2) times a day. 1 tablespoon with 8 oz of water daily 238 g 0    ondansetron (ZOFRAN ODT) 8 mg disintegrating tablet Take 1 Tab by mouth every eight (8) hours as needed for Nausea for up to 12 doses. 12 Tab 0       Past History     Past Medical History:  Past Medical History:   Diagnosis Date    Bladder infection     Hypertension     Vertigo        Past Surgical History:  No past surgical history on file.     Family History:  No family history on file. Social History:  Social History     Tobacco Use    Smoking status: Never Smoker    Smokeless tobacco: Never Used   Substance Use Topics    Alcohol use: No    Drug use: No       Allergies: Allergies   Allergen Reactions    Phenergan [Promethazine] Itching     \"shakes\"         Review of Systems   Review of Systems   Constitutional: Negative for fever. Gastrointestinal: Positive for blood in stool and nausea. Negative for abdominal pain and vomiting. Genitourinary: Negative for dysuria, hematuria and vaginal bleeding. Neurological: Positive for dizziness. All other systems reviewed and are negative. Physical Exam     Vitals:    10/24/19 0042 10/24/19 0101 10/24/19 0117 10/24/19 0118   BP: (!) 144/99  110/76    Pulse: 82   70   Resp: 16   15   Temp: 98.8 °F (37.1 °C)      SpO2: 100% 100%     Weight: 72.6 kg (160 lb)      Height: 5' 7\" (1.702 m)        Physical Exam  Vital signs and nursing notes reviewed. CONSTITUTIONAL: Alert. Well-appearing; well-nourished; in no apparent distress. HEAD: Normocephalic; atraumatic. EYES: PERRL; EOM's intact. No nystagmus. Conjunctiva clear. ENT:  Moist mucus membranes. NECK: Supple; FROM without difficulty, non-tender; no cervical lymphadenopathy. CV: Normal S1, S2; no murmurs, rubs, or gallops. No chest wall tenderness. RESPIRATORY: Normal chest excursion with respiration; breath sounds clear and equal bilaterally; no wheezes, rhonchi, or rales. GI: Normal bowel sounds; non-distended; non-tender; no guarding or rigidity; no palpable organomegaly. No CVA tenderness. SKIN: Normal for age and race; warm; dry; good turgor; no apparent lesions or exudate. NEURO: A & O x3. Cranial nerves 2-12 intact. Motor 5/5 bilaterally. Sensation intact. PSYCH:  Mood and affect appropriate.            Diagnostic Study Results     Labs -  EKG 12:39 AM  Normal sinus rhythm, rate 81, no STEMI    Recent Results (from the past 12 hour(s))   EKG, 12 LEAD, INITIAL    Collection Time: 10/24/19 12:39 AM   Result Value Ref Range    Ventricular Rate 81 BPM    Atrial Rate 81 BPM    P-R Interval 182 ms    QRS Duration 82 ms    Q-T Interval 386 ms    QTC Calculation (Bezet) 448 ms    Calculated P Axis 57 degrees    Calculated R Axis 67 degrees    Calculated T Axis 52 degrees    Diagnosis       Normal sinus rhythm  Normal ECG  No previous ECGs available     CBC WITH AUTOMATED DIFF    Collection Time: 10/24/19 12:50 AM   Result Value Ref Range    WBC 5.9 4.6 - 13.2 K/uL    RBC 4.25 4.20 - 5.30 M/uL    HGB 13.3 12.0 - 16.0 g/dL    HCT 37.8 35.0 - 45.0 %    MCV 88.9 74.0 - 97.0 FL    MCH 31.3 24.0 - 34.0 PG    MCHC 35.2 31.0 - 37.0 g/dL    RDW 12.7 11.6 - 14.5 %    PLATELET 823 537 - 579 K/uL    MPV 9.7 9.2 - 11.8 FL    NEUTROPHILS 40 40 - 73 %    LYMPHOCYTES 50 21 - 52 %    MONOCYTES 8 3 - 10 %    EOSINOPHILS 2 0 - 5 %    BASOPHILS 0 0 - 2 %    ABS. NEUTROPHILS 2.4 1.8 - 8.0 K/UL    ABS. LYMPHOCYTES 2.9 0.9 - 3.6 K/UL    ABS. MONOCYTES 0.5 0.05 - 1.2 K/UL    ABS. EOSINOPHILS 0.1 0.0 - 0.4 K/UL    ABS.  BASOPHILS 0.0 0.0 - 0.1 K/UL    DF AUTOMATED     CARDIAC PANEL,(CK, CKMB & TROPONIN)    Collection Time: 10/24/19 12:50 AM   Result Value Ref Range     26 - 192 U/L    CK - MB <1.0 <3.6 ng/ml    CK-MB Index  0.0 - 4.0 %     CALCULATION NOT PERFORMED WHEN RESULT IS BELOW LINEAR LIMIT    Troponin-I, QT <0.02 0.0 - 8.494 NG/ML   METABOLIC PANEL, COMPREHENSIVE    Collection Time: 10/24/19 12:50 AM   Result Value Ref Range    Sodium 139 136 - 145 mmol/L    Potassium 3.6 3.5 - 5.5 mmol/L    Chloride 108 100 - 111 mmol/L    CO2 26 21 - 32 mmol/L    Anion gap 5 3.0 - 18 mmol/L    Glucose 126 (H) 74 - 99 mg/dL    BUN 7 7.0 - 18 MG/DL    Creatinine 0.59 (L) 0.6 - 1.3 MG/DL    BUN/Creatinine ratio 12 12 - 20      GFR est AA >60 >60 ml/min/1.73m2    GFR est non-AA >60 >60 ml/min/1.73m2    Calcium 8.1 (L) 8.5 - 10.1 MG/DL    Bilirubin, total 0.5 0.2 - 1.0 MG/DL    ALT (SGPT) 39 13 - 56 U/L    AST (SGOT) 28 10 - 38 U/L    Alk. phosphatase 50 45 - 117 U/L    Protein, total 7.4 6.4 - 8.2 g/dL    Albumin 3.4 3.4 - 5.0 g/dL    Globulin 4.0 2.0 - 4.0 g/dL    A-G Ratio 0.9 0.8 - 1.7     OCCULT BLOOD, STOOL    Collection Time: 10/24/19  1:00 AM   Result Value Ref Range    Occult blood, stool POSITIVE (A) NEG     URINALYSIS W/ RFLX MICROSCOPIC    Collection Time: 10/24/19  1:10 AM   Result Value Ref Range    Color YELLOW      Appearance CLEAR      Specific gravity 1.016 1.005 - 1.030      pH (UA) 5.5 5.0 - 8.0      Protein NEGATIVE  NEG mg/dL    Glucose NEGATIVE  NEG mg/dL    Ketone NEGATIVE  NEG mg/dL    Bilirubin NEGATIVE  NEG      Blood NEGATIVE  NEG      Urobilinogen 1.0 0.2 - 1.0 EU/dL    Nitrites NEGATIVE  NEG      Leukocyte Esterase SMALL (A) NEG         Radiologic Studies - none  No orders to display     CT Results  (Last 48 hours)    None        CXR Results  (Last 48 hours)    None          Medications given in the ED-  Medications - No data to display      Medical Decision Making   I am the first provider for this patient. I reviewed the vital signs, available nursing notes, past medical history, past surgical history, family history and social history. Vital Signs-Reviewed the patient's vital signs. Records Reviewed: Nursing Notes      Procedures:  Procedures    ED Course:  12:48 AM   Initial assessment performed. The patients presenting problems have been discussed, and they are in agreement with the care plan formulated and outlined with them. I have encouraged them to ask questions as they arise throughout their visit. Provider Notes (Medical Decision Making): Afia Perez is a 54 y.o. female who presents with primary complaint of 2 episodes of blood in her school today. No associated abdominal or rectal pain and no black stools. History of similar symptoms 9 months ago which resolved spontaneously.   She also has secondary complaint of vertigo which has been ongoing for over a year and no different today. Vital signs are stable, not hypotensive or tachycardic. H&H is normal.  Rectal exam showed scant amount of brown stool with bright red blood was hemoccult positive. She does not smoke, take NSAIDs or have a history of peptic ulcer disease. Later reported that her father had colon cancer. CT of the abdomen and pelvis in January 2019 showed no acute process. Most likely source of her rectal bleeding from internal hemorrhoids or diverticular bleed, but emergent imaging or colonoscopy not required at this time. However patient advised to take stool softener and follow-up with GI for colonoscopy. Return precautions such as increased bleeding or lightheadedness or abdominal pain discussed. Diagnosis and Disposition       DISCHARGE NOTE:    Matt So's  results have been reviewed with her. She has been counseled regarding her diagnosis, treatment, and plan. She verbally conveys understanding and agreement of the signs, symptoms, diagnosis, treatment and prognosis and additionally agrees to follow up as discussed. She also agrees with the care-plan and conveys that all of her questions have been answered. I have also provided discharge instructions for her that include: educational information regarding their diagnosis and treatment, and list of reasons why they would want to return to the ED prior to their follow-up appointment, should her condition change. She has been provided with education for proper emergency department utilization. CLINICAL IMPRESSION:    1. BRBPR (bright red blood per rectum)    2. Vertigo        PLAN:  1. D/C Home  2. Current Discharge Medication List      START taking these medications    Details   meclizine (ANTIVERT) 25 mg tablet Take 1 Tab by mouth three (3) times daily as needed for Dizziness for up to 7 days. Qty: 21 Tab, Refills: 0           3.    Follow-up Information     Follow up With Specialties Details Why Contact Info    Josesito Cobb MD Internal Medicine Schedule an appointment as soon as possible for a visit  [address] [facility]  [phone]      Yunier Box MD Gastroenterology Schedule an appointment as soon as possible for a visit  [address] [address] [facility] [address] [facility] EMERGENCY DEPT Emergency Medicine  If symptoms worsen, As needed [address] [address]  [phone]        _______________________________      Please note that this dictation was completed with [employer], the computer voice recognition software. Quite often unanticipated grammatical, syntax, homophones, and other interpretive errors are inadvertently transcribed by the computer software. Please disregard these errors. Please excuse any errors that have escaped final proofreading.

## 2019-10-24 NOTE — DISCHARGE INSTRUCTIONS
Patient Education        Dizziness: Care Instructions  Your Care Instructions  Dizziness is the feeling of unsteadiness or fuzziness in your head. It is different than having vertigo, which is a feeling that the room is spinning or that you are moving or falling. It is also different from lightheadedness, which is the feeling that you are about to faint. It can be hard to know what causes dizziness. Some people feel dizzy when they have migraine headaches. Sometimes bouts of flu can make you feel dizzy. Some medical conditions, such as heart problems or high blood pressure, can make you feel dizzy. Many medicines can cause dizziness, including medicines for high blood pressure, pain, or anxiety. If a medicine causes your symptoms, your doctor may recommend that you stop or change the medicine. If it is a problem with your heart, you may need medicine to help your heart work better. If there is no clear reason for your symptoms, your doctor may suggest watching and waiting for a while to see if the dizziness goes away on its own. Follow-up care is a key part of your treatment and safety. Be sure to make and go to all appointments, and call your doctor if you are having problems. It's also a good idea to know your test results and keep a list of the medicines you take. How can you care for yourself at home? · If your doctor recommends or prescribes medicine, take it exactly as directed. Call your doctor if you think you are having a problem with your medicine. · Do not drive while you feel dizzy. · Try to prevent falls. Steps you can take include:  ? Using nonskid mats, adding grab bars near the tub, and using night-lights. ? Clearing your home so that walkways are free of anything you might trip on.  ? Letting family and friends know that you have been feeling dizzy. This will help them know how to help you. When should you call for help? Call 911 anytime you think you may need emergency care.  For example, call if:    · You passed out (lost consciousness).     · You have dizziness along with symptoms of a heart attack. These may include:  ? Chest pain or pressure, or a strange feeling in the chest.  ? Sweating. ? Shortness of breath. ? Nausea or vomiting. ? Pain, pressure, or a strange feeling in the back, neck, jaw, or upper belly or in one or both shoulders or arms. ? Lightheadedness or sudden weakness. ? A fast or irregular heartbeat.     · You have symptoms of a stroke. These may include:  ? Sudden numbness, tingling, weakness, or loss of movement in your face, arm, or leg, especially on only one side of your body. ? Sudden vision changes. ? Sudden trouble speaking. ? Sudden confusion or trouble understanding simple statements. ? Sudden problems with walking or balance. ? A sudden, severe headache that is different from past headaches.    Call your doctor now or seek immediate medical care if:    · You feel dizzy and have a fever, headache, or ringing in your ears.     · You have new or increased nausea and vomiting.     · Your dizziness does not go away or comes back.    Watch closely for changes in your health, and be sure to contact your doctor if:    · You do not get better as expected. Where can you learn more? Go to http://isacc-nash.info/. Enter Z486 in the search box to learn more about \"Dizziness: Care Instructions. \"  Current as of: June 26, 2019  Content Version: 12.2  © 2008-7464 [employer]. Care instructions adapted under license by [employer] (which disclaims liability or warranty for this information). If you have questions about a medical condition or this instruction, always ask your healthcare professional. [place] [address] any warranty or liability for your use of this information. Patient Education        Colonoscopy: Before Your Procedure  What is a colonoscopy?     A colonoscopy is a test that lets a doctor look inside your colon. The doctor uses a thin, lighted tube called a colonoscope to look for problems. These include small growths called polyps, cancer, or bleeding. During the test, the doctor can take samples of tissue that can be checked for cancer or other problems. This is called a biopsy. The doctor can also take out polyps. Before the test, you will need to stop eating solid foods. You also will drink a liquid or take a tablet that cleans out your colon. This helps your doctor be able to see inside your colon during the test.  Follow-up care is a key part of your treatment and safety. Be sure to make and go to all appointments, and call your doctor if you are having problems. It's also a good idea to know your test results and keep a list of the medicines you take. What happens before the procedure?   Preparing for the procedure    · Understand exactly what procedure is planned, along with the risks, benefits, and other options. · Tell your doctors ALL the medicines, vitamins, supplements, and herbal remedies you take. Some of these can increase the risk of bleeding or interact with anesthesia.     · If you take blood thinners, such as warfarin (Coumadin), clopidogrel (Plavix), or aspirin, be sure to talk to your doctor. He or she will tell you if you should stop taking these medicines before your procedure. Make sure that you understand exactly what your doctor wants you to do.     · Your doctor will tell you which medicines to take or stop before your procedure. You may need to stop taking certain medicines a week or more before the procedure. So talk to your doctor as soon as you can.     · If you have an advance directive, let your doctor know. It may include a living will and a durable power of  for health care. Bring a copy to the hospital. If you don't have one, you may want to prepare one. It lets your doctor and loved ones know your health care wishes.  Doctors advise that everyone prepare these papers before any type of surgery or procedure.    Before the procedure    · Follow your doctor's directions about when to stop eating solid foods and drink only clear liquids. You can drink water, clear juices, clear broths, flavored ice pops, and gelatin (such as Jell-O). Do not eat or drink anything red or purple. This includes grape juice and grape-flavored ice pops. It also includes fruit punch and cherry gelatin.     · Drink the \"colon prep\" liquid as your doctor tells you. You will want to stay home, because the liquid will make you go to the bathroom a lot. Your stools will be loose and watery. It is very important to drink all of the liquid. If you have problems drinking it, call your doctor. Some doctors may have you take a tablet rather than drink a liquid.     · Do not eat any solid foods after you drink the colon prep.     · Stop drinking clear liquids 6 to 8 hours before the test.   Procedures can be stressful. This information will help you understand what you can expect. And it will help you safely prepare for your procedure. What happens on the day of the procedure? · Follow the instructions exactly about when to stop eating and drinking. If you don't, your procedure may be canceled. If your doctor told you to take your medicines on the day of the procedure, take them with only a sip of water.     · Take a bath or shower before you come in for your procedure. Do not apply lotions, perfumes, deodorants, or nail polish.     · Take off all jewelry and piercings. And take out contact lenses, if you wear them.    At the doctor's office or hospital   · Bring a picture ID.     · You will be kept comfortable and safe by your anesthesia provider. The anesthesia may make you sleep.     · You will lie on your back or your side with your knees drawn up toward your belly. The doctor will gently put a gloved finger into your anus. Then the doctor puts the scope in and moves it into your colon. The scope goes in easily because it is lubricated.     · The doctor may also use small tools to take tissue samples for a biopsy or to remove polyps. This does not hurt.     · The test usually takes 30 to 45 minutes. But it may take longer. It depends on what is found and what is done. Going home   · Be sure you have someone to drive you home. Anesthesia and pain medicine make it unsafe for you to drive.     · You will be given more specific instructions about recovering from your procedure. When should you call your doctor? · You have questions or concerns.     · You don't understand how to prepare for your procedure.     · You are having trouble with the bowel prep.     · You become ill before the procedure (such as fever, flu, or a cold).     · You need to reschedule or have changed your mind about having the procedure. Where can you learn more? Go to http://isaccInpria Corporationnash.info/. Enter C315 in the search box to learn more about \"Colonoscopy: Before Your Procedure. \"  Current as of: December 19, 2018  Content Version: 12.2  © 1076-8837 [employer]. Care instructions adapted under license by [employer] (which disclaims liability or warranty for this information). If you have questions about a medical condition or this instruction, always ask your healthcare professional. [address] any warranty or liability for your use of this information. Patient Education        Rectal Bleeding: Care Instructions  Your Care Instructions    Rectal bleeding in small amounts is common. You may see red spotting on toilet paper or drops of blood in the toilet. Rectal bleeding has many possible causes, from something as minor as hemorrhoids to something as serious as colon cancer. You may need more tests to find the cause of your bleeding. Follow-up care is a key part of your treatment and safety.  Be sure to make and go to all appointments, and call your doctor if you are having problems. It's also a good idea to know your test results and keep a list of the medicines you take. How can you care for yourself at home? · Avoid aspirin and other nonsteroidal anti-inflammatory drugs (NSAIDs), such as ibuprofen (Advil, Motrin) and naproxen (Aleve). They can cause you to bleed more. Ask your doctor if you can take acetaminophen (Tylenol). Read and follow all instructions on the label. · Use a stool softener that contains bran or psyllium. You can save money by buying bran or psyllium (available in bulk at most health food stores) and sprinkling it on foods or stirring it into fruit juice. You can also use a product such as Metamucil or Citrucel. · Take your medicines exactly as directed. Call your doctor if you think you are having a problem with your medicine. When should you call for help? Call 911 anytime you think you may need emergency care. For example, call if:    · You passed out (lost consciousness).    Call your doctor now or seek immediate medical care if:    · You have new or worse pain.     · You have new or worse bleeding from the rectum.     · You are dizzy or light-headed, or you feel like you may faint.    Watch closely for changes in your health, and be sure to contact your doctor if:    · You cannot pass stools or gas.     · You do not get better as expected. Where can you learn more? Go to http://isacc-nash.info/. Enter H750 in the search box to learn more about \"Rectal Bleeding: Care Instructions. \"  Current as of: November 7, 2018  Content Version: 12.2  © 4530-5808 [employer]. Care instructions adapted under license by [employer] (which disclaims liability or warranty for this information). If you have questions about a medical condition or this instruction, always ask your healthcare professional. [address] any warranty or liability for your use of this information.

## 2019-10-25 LAB
ATRIAL RATE: 81 BPM
CALCULATED P AXIS, ECG09: 57 DEGREES
CALCULATED R AXIS, ECG10: 67 DEGREES
CALCULATED T AXIS, ECG11: 52 DEGREES
DIAGNOSIS, 93000: NORMAL
P-R INTERVAL, ECG05: 182 MS
Q-T INTERVAL, ECG07: 386 MS
QRS DURATION, ECG06: 82 MS
QTC CALCULATION (BEZET), ECG08: 448 MS
VENTRICULAR RATE, ECG03: 81 BPM

## 2021-10-15 ENCOUNTER — HOSPITAL ENCOUNTER (EMERGENCY)
Age: 51
Discharge: HOME | End: 2021-10-15
Attending: EMERGENCY MEDICINE
Payer: MEDICAID

## 2021-10-15 ENCOUNTER — APPOINTMENT (OUTPATIENT)
Dept: GENERAL RADIOLOGY | Age: 51
End: 2021-10-15
Attending: PHYSICIAN ASSISTANT
Payer: MEDICAID

## 2021-10-15 VITALS
TEMPERATURE: 97.5 F | BODY MASS INDEX: 25.61 KG/M2 | HEIGHT: 64 IN | DIASTOLIC BLOOD PRESSURE: 97 MMHG | OXYGEN SATURATION: 99 % | HEART RATE: 100 BPM | WEIGHT: 150 LBS | SYSTOLIC BLOOD PRESSURE: 155 MMHG | RESPIRATION RATE: 18 BRPM

## 2021-10-15 DIAGNOSIS — R10.13 ABDOMINAL PAIN, EPIGASTRIC: ICD-10-CM

## 2021-10-15 DIAGNOSIS — N30.00 ACUTE CYSTITIS WITHOUT HEMATURIA: ICD-10-CM

## 2021-10-15 DIAGNOSIS — R74.01 TRANSAMINITIS: ICD-10-CM

## 2021-10-15 DIAGNOSIS — Z20.822 PERSON UNDER INVESTIGATION FOR COVID-19: ICD-10-CM

## 2021-10-15 DIAGNOSIS — F12.90 MARIJUANA USE: ICD-10-CM

## 2021-10-15 DIAGNOSIS — R11.2 NAUSEA AND VOMITING, INTRACTABILITY OF VOMITING NOT SPECIFIED, UNSPECIFIED VOMITING TYPE: Primary | ICD-10-CM

## 2021-10-15 LAB
ALBUMIN SERPL-MCNC: 4.5 G/DL (ref 3.4–5)
ALBUMIN/GLOB SERPL: 0.9 {RATIO} (ref 0.8–1.7)
ALP SERPL-CCNC: 66 U/L (ref 45–117)
ALT SERPL-CCNC: 71 U/L (ref 13–56)
AMPHET UR QL SCN: NEGATIVE
ANION GAP SERPL CALC-SCNC: 5 MMOL/L (ref 3–18)
APPEARANCE UR: ABNORMAL
AST SERPL-CCNC: 41 U/L (ref 10–38)
ATRIAL RATE: 90 BPM
BACTERIA URNS QL MICRO: ABNORMAL /HPF
BARBITURATES UR QL SCN: NEGATIVE
BASOPHILS # BLD: 0 K/UL (ref 0–0.1)
BASOPHILS NFR BLD: 0 % (ref 0–2)
BENZODIAZ UR QL: NEGATIVE
BILIRUB SERPL-MCNC: 0.7 MG/DL (ref 0.2–1)
BILIRUB UR QL: ABNORMAL
BUN SERPL-MCNC: 18 MG/DL (ref 7–18)
BUN/CREAT SERPL: 23 (ref 12–20)
CALCIUM SERPL-MCNC: 10 MG/DL (ref 8.5–10.1)
CALCULATED P AXIS, ECG09: 67 DEGREES
CALCULATED R AXIS, ECG10: 76 DEGREES
CALCULATED T AXIS, ECG11: 56 DEGREES
CANNABINOIDS UR QL SCN: POSITIVE
CHLORIDE SERPL-SCNC: 97 MMOL/L (ref 100–111)
CK MB CFR SERPL CALC: NORMAL % (ref 0–4)
CK MB SERPL-MCNC: <1 NG/ML (ref 5–25)
CK SERPL-CCNC: 69 U/L (ref 26–192)
CO2 SERPL-SCNC: 32 MMOL/L (ref 21–32)
COCAINE UR QL SCN: NEGATIVE
COLOR UR: ABNORMAL
CREAT SERPL-MCNC: 0.77 MG/DL (ref 0.6–1.3)
DIAGNOSIS, 93000: NORMAL
DIFFERENTIAL METHOD BLD: ABNORMAL
EOSINOPHIL # BLD: 0 K/UL (ref 0–0.4)
EOSINOPHIL NFR BLD: 0 % (ref 0–5)
EPITH CASTS URNS QL MICRO: ABNORMAL /LPF (ref 0–5)
ERYTHROCYTE [DISTWIDTH] IN BLOOD BY AUTOMATED COUNT: 12.5 % (ref 11.6–14.5)
GLOBULIN SER CALC-MCNC: 5.2 G/DL (ref 2–4)
GLUCOSE SERPL-MCNC: 127 MG/DL (ref 74–99)
GLUCOSE UR STRIP.AUTO-MCNC: NEGATIVE MG/DL
HCG UR QL: NEGATIVE
HCT VFR BLD AUTO: 46.4 % (ref 35–45)
HDSCOM,HDSCOM: ABNORMAL
HGB BLD-MCNC: 16.1 G/DL (ref 12–16)
HGB UR QL STRIP: NEGATIVE
KETONES UR QL STRIP.AUTO: 40 MG/DL
LEUKOCYTE ESTERASE UR QL STRIP.AUTO: ABNORMAL
LIPASE SERPL-CCNC: 140 U/L (ref 73–393)
LYMPHOCYTES # BLD: 2.9 K/UL (ref 0.9–3.6)
LYMPHOCYTES NFR BLD: 35 % (ref 21–52)
MAGNESIUM SERPL-MCNC: 2.3 MG/DL (ref 1.6–2.6)
MCH RBC QN AUTO: 31.2 PG (ref 24–34)
MCHC RBC AUTO-ENTMCNC: 34.7 G/DL (ref 31–37)
MCV RBC AUTO: 89.9 FL (ref 78–100)
METHADONE UR QL: NEGATIVE
MONOCYTES # BLD: 0.5 K/UL (ref 0.05–1.2)
MONOCYTES NFR BLD: 6 % (ref 3–10)
MUCOUS THREADS URNS QL MICRO: ABNORMAL /LPF
NEUTS SEG # BLD: 4.8 K/UL (ref 1.8–8)
NEUTS SEG NFR BLD: 58 % (ref 40–73)
NITRITE UR QL STRIP.AUTO: NEGATIVE
OPIATES UR QL: POSITIVE
P-R INTERVAL, ECG05: 140 MS
PCP UR QL: NEGATIVE
PH UR STRIP: 6 [PH] (ref 5–8)
PLATELET # BLD AUTO: 379 K/UL (ref 135–420)
PMV BLD AUTO: 10.1 FL (ref 9.2–11.8)
POTASSIUM SERPL-SCNC: 3.5 MMOL/L (ref 3.5–5.5)
PROT SERPL-MCNC: 9.7 G/DL (ref 6.4–8.2)
PROT UR STRIP-MCNC: 30 MG/DL
Q-T INTERVAL, ECG07: 398 MS
QRS DURATION, ECG06: 80 MS
QTC CALCULATION (BEZET), ECG08: 486 MS
RBC # BLD AUTO: 5.16 M/UL (ref 4.2–5.3)
RBC #/AREA URNS HPF: ABNORMAL /HPF (ref 0–5)
SARS-COV-2, COV2: NORMAL
SODIUM SERPL-SCNC: 134 MMOL/L (ref 136–145)
SP GR UR REFRACTOMETRY: 1.03 (ref 1–1.03)
TROPONIN I SERPL-MCNC: <0.02 NG/ML (ref 0–0.04)
UROBILINOGEN UR QL STRIP.AUTO: 1 EU/DL (ref 0.2–1)
VENTRICULAR RATE, ECG03: 90 BPM
WBC # BLD AUTO: 8.3 K/UL (ref 4.6–13.2)
WBC URNS QL MICRO: >100 /HPF (ref 0–5)

## 2021-10-15 PROCEDURE — 99284 EMERGENCY DEPT VISIT MOD MDM: CPT

## 2021-10-15 PROCEDURE — 83690 ASSAY OF LIPASE: CPT

## 2021-10-15 PROCEDURE — U0005 INFEC AGEN DETEC AMPLI PROBE: HCPCS

## 2021-10-15 PROCEDURE — 81001 URINALYSIS AUTO W/SCOPE: CPT

## 2021-10-15 PROCEDURE — 74011250636 HC RX REV CODE- 250/636: Performed by: PHYSICIAN ASSISTANT

## 2021-10-15 PROCEDURE — 74022 RADEX COMPL AQT ABD SERIES: CPT

## 2021-10-15 PROCEDURE — C9113 INJ PANTOPRAZOLE SODIUM, VIA: HCPCS | Performed by: PHYSICIAN ASSISTANT

## 2021-10-15 PROCEDURE — 81025 URINE PREGNANCY TEST: CPT

## 2021-10-15 PROCEDURE — 83735 ASSAY OF MAGNESIUM: CPT

## 2021-10-15 PROCEDURE — 93005 ELECTROCARDIOGRAM TRACING: CPT

## 2021-10-15 PROCEDURE — 96361 HYDRATE IV INFUSION ADD-ON: CPT

## 2021-10-15 PROCEDURE — 82553 CREATINE MB FRACTION: CPT

## 2021-10-15 PROCEDURE — 80053 COMPREHEN METABOLIC PANEL: CPT

## 2021-10-15 PROCEDURE — 85025 COMPLETE CBC W/AUTO DIFF WBC: CPT

## 2021-10-15 PROCEDURE — 74011000250 HC RX REV CODE- 250: Performed by: PHYSICIAN ASSISTANT

## 2021-10-15 PROCEDURE — 80307 DRUG TEST PRSMV CHEM ANLYZR: CPT

## 2021-10-15 PROCEDURE — 96375 TX/PRO/DX INJ NEW DRUG ADDON: CPT

## 2021-10-15 PROCEDURE — 87086 URINE CULTURE/COLONY COUNT: CPT

## 2021-10-15 PROCEDURE — 96374 THER/PROPH/DIAG INJ IV PUSH: CPT

## 2021-10-15 PROCEDURE — 80074 ACUTE HEPATITIS PANEL: CPT

## 2021-10-15 RX ORDER — ONDANSETRON 2 MG/ML
4 INJECTION INTRAMUSCULAR; INTRAVENOUS
Status: COMPLETED | OUTPATIENT
Start: 2021-10-15 | End: 2021-10-15

## 2021-10-15 RX ORDER — DIPHENHYDRAMINE HYDROCHLORIDE 50 MG/ML
25 INJECTION, SOLUTION INTRAMUSCULAR; INTRAVENOUS
Status: COMPLETED | OUTPATIENT
Start: 2021-10-15 | End: 2021-10-15

## 2021-10-15 RX ORDER — ONDANSETRON 4 MG/1
4-8 TABLET, ORALLY DISINTEGRATING ORAL
Qty: 20 TABLET | Refills: 0 | Status: SHIPPED | OUTPATIENT
Start: 2021-10-15

## 2021-10-15 RX ORDER — DROPERIDOL 2.5 MG/ML
1.25 INJECTION, SOLUTION INTRAMUSCULAR; INTRAVENOUS ONCE
Status: COMPLETED | OUTPATIENT
Start: 2021-10-15 | End: 2021-10-15

## 2021-10-15 RX ORDER — CIPROFLOXACIN 250 MG/1
TABLET, FILM COATED ORAL EVERY 12 HOURS
COMMUNITY
End: 2021-10-15

## 2021-10-15 RX ORDER — MORPHINE SULFATE 4 MG/ML
4 INJECTION INTRAVENOUS
Status: COMPLETED | OUTPATIENT
Start: 2021-10-15 | End: 2021-10-15

## 2021-10-15 RX ORDER — GABAPENTIN 100 MG/1
CAPSULE ORAL 3 TIMES DAILY
COMMUNITY

## 2021-10-15 RX ORDER — PANTOPRAZOLE SODIUM 40 MG/1
40 TABLET, DELAYED RELEASE ORAL DAILY
Qty: 20 TABLET | Refills: 0 | Status: SHIPPED | OUTPATIENT
Start: 2021-10-15 | End: 2021-11-04

## 2021-10-15 RX ORDER — SULFAMETHOXAZOLE AND TRIMETHOPRIM 800; 160 MG/1; MG/1
1 TABLET ORAL 2 TIMES DAILY
Qty: 14 TABLET | Refills: 0 | Status: SHIPPED | OUTPATIENT
Start: 2021-10-15 | End: 2021-10-22

## 2021-10-15 RX ORDER — PANTOPRAZOLE SODIUM 40 MG/10ML
40 INJECTION, POWDER, LYOPHILIZED, FOR SOLUTION INTRAVENOUS
Status: COMPLETED | OUTPATIENT
Start: 2021-10-15 | End: 2021-10-15

## 2021-10-15 RX ADMIN — DROPERIDOL 1.25 MG: 2.5 INJECTION, SOLUTION INTRAMUSCULAR; INTRAVENOUS at 13:44

## 2021-10-15 RX ADMIN — PANTOPRAZOLE SODIUM 40 MG: 40 INJECTION, POWDER, FOR SOLUTION INTRAVENOUS at 13:43

## 2021-10-15 RX ADMIN — SODIUM CHLORIDE, SODIUM LACTATE, POTASSIUM CHLORIDE, AND CALCIUM CHLORIDE 1000 ML: 600; 310; 30; 20 INJECTION, SOLUTION INTRAVENOUS at 13:47

## 2021-10-15 RX ADMIN — DIPHENHYDRAMINE HYDROCHLORIDE 25 MG: 50 INJECTION, SOLUTION INTRAMUSCULAR; INTRAVENOUS at 14:47

## 2021-10-15 RX ADMIN — CEFTRIAXONE 1 G: 1 INJECTION, POWDER, FOR SOLUTION INTRAMUSCULAR; INTRAVENOUS at 14:47

## 2021-10-15 RX ADMIN — MORPHINE SULFATE 4 MG: 4 INJECTION INTRAVENOUS at 14:48

## 2021-10-15 RX ADMIN — ONDANSETRON 4 MG: 2 INJECTION INTRAMUSCULAR; INTRAVENOUS at 14:47

## 2021-10-15 NOTE — ED PROVIDER NOTES
EMERGENCY DEPARTMENT HISTORY AND PHYSICAL EXAM    Date: 10/15/2021  Patient Name: Harriet Montano    History of Presenting Illness     Chief Complaint   Patient presents with    Vomiting         History Provided By: Patient    Chief Complaint: vomiting    HPI(Context):   1:05 PM  Harriet Montano is a 54 y.o. female with PMHX of HTN who presents to the emergency department C/O vomiting. Associated sxs include nausea. Sxs x 5 days. Sxs worse with PO intake. Pt endorses habitual marijuana use. Pt had liposuction performed at clinic in [place] in 08/2021. Pt developed post-op seroma per EMR [facility] Chart review that has since improved. EMR review reveals 4 visit to [facility] [address] [facility] with last visit one day ago for similar complaints. Pt was dx with UTI and placed on kelfex which pt states she is taking. Urine C&S from 10/12 revealed contaminated sample. Pt has had multiple CT scans during [facility] ED visit including CT abdomen on 09/03 and 10/11. CT HEAD performed on 10/12. Pt denies fever, chills, diarrhea, dysuria, CP, SOB, COVID exposures, and any other sxs or complaints. Pt has not been tested for COVID-19. Pt is not vaccinated. PCP: Kate Patton, DO    Current Outpatient Medications   Medication Sig Dispense Refill    gabapentin (NEURONTIN) 100 mg capsule Take  by mouth three (3) times daily.  ondansetron (Zofran ODT) 4 mg disintegrating tablet Take 1-2 Tablets by mouth every eight (8) hours as needed for Nausea (or vomiting.). Allow to dissolve on tongue 20 Tablet 0    trimethoprim-sulfamethoxazole (Bactrim DS) 160-800 mg per tablet Take 1 Tablet by mouth two (2) times a day for 7 days. Indications: bacterial urinary tract infection 14 Tablet 0    pantoprazole (Protonix) 40 mg tablet Take 1 Tablet by mouth daily for 20 days. Indications: gastroesophageal reflux disease 20 Tablet 0    polyethylene glycol (MIRALAX) 17 gram/dose powder Take 17 g by mouth two (2) times a day.  1 tablespoon with 8 oz of water daily 238 g 0       Past History     Past Medical History:  Past Medical History:   Diagnosis Date    Bladder infection     Hypertension     Vertigo        Past Surgical History:  Past Surgical History:   Procedure Laterality Date    HX LIPOSUCTION         Family History:  History reviewed. No pertinent family history. Social History:  Social History     Tobacco Use    Smoking status: Never Smoker    Smokeless tobacco: Never Used   Substance Use Topics    Alcohol use: No    Drug use: Yes     Types: Marijuana     Comment: rarely       Allergies: Allergies   Allergen Reactions    Phenergan [Promethazine] Itching     \"shakes\"         Review of Systems   Review of Systems   Constitutional: Positive for appetite change. Negative for chills and fever. Respiratory: Negative for cough and shortness of breath. Gastrointestinal: Positive for abdominal pain, nausea and vomiting. Negative for diarrhea. Genitourinary: Negative for dysuria. Musculoskeletal: Negative for back pain. Allergic/Immunologic: Negative for immunocompromised state. Neurological: Negative for dizziness and light-headedness. All other systems reviewed and are negative. Physical Exam     Vitals:    10/15/21 1147 10/15/21 1225 10/15/21 1245 10/15/21 1330   BP: (!) 152/109 (!) 156/95 (!) 161/93 (!) 155/97   Pulse: 100      Resp: 18      Temp: 97.5 °F (36.4 °C)      SpO2: 100% 100% 98% 99%   Weight: 68 kg (150 lb)      Height: 5' 4\" (1.626 m)        Physical Exam  Vitals and nursing note reviewed. Constitutional:       General: She is not in acute distress. Appearance: She is well-developed. She is not diaphoretic. Comments: AA female in NAD. Alert. Appears comfortable. HENT:      Head: Normocephalic and atraumatic. Right Ear: External ear normal.      Left Ear: External ear normal.      Nose: Nose normal.   Eyes:      General: No scleral icterus. Right eye: No discharge.          Left eye: No discharge. Conjunctiva/sclera: Conjunctivae normal.   Cardiovascular:      Rate and Rhythm: Normal rate and regular rhythm. Heart sounds: Normal heart sounds. No murmur heard. No friction rub. No gallop. Pulmonary:      Effort: Pulmonary effort is normal. No tachypnea, accessory muscle usage or respiratory distress. Breath sounds: Normal breath sounds. No decreased breath sounds, wheezing, rhonchi or rales. Abdominal:      Palpations: Abdomen is soft. Tenderness: There is no abdominal tenderness. There is no right CVA tenderness, left CVA tenderness, guarding or rebound. Negative signs include McBurney's sign. Musculoskeletal:         General: Normal range of motion. Cervical back: Normal range of motion. Skin:     General: Skin is warm and dry. Findings: No rash. Neurological:      Mental Status: She is alert and oriented to person, place, and time. Psychiatric:         Behavior: Behavior normal.         Judgment: Judgment normal.             Diagnostic Study Results     Labs -     No results found for this or any previous visit (from the past 12 hour(s)). XR ABD ACUTE W 1 V CHEST   Final Result         1. No acute radiographic abnormality demonstrated. CT Results  (Last 48 hours)    None        CXR Results  (Last 48 hours)               10/15/21 1402  XR ABD ACUTE W 1 V CHEST Final result    Impression:          1. No acute radiographic abnormality demonstrated. Narrative:  EXAM: ABDOMINAL RADIOGRAPHS WITH PA VIEW OF THE CHEST       CLINICAL INDICATION/HISTORY: abdominal pain, chest pain     > Additional: None       COMPARISON: CT of the abdomen and pelvis 1/21/2019       TECHNIQUE: Supine and upright views of abdomen and PA view of the chest       _______________       FINDINGS:       HEART AND MEDIASTINUM: Normal cardiac size and mediastinal contours. LUNGS AND PLEURAL SPACES: No focal pneumonic opacity. No pneumothorax or pleural   effusion. BOWEL GAS PATTERN: No evidence of obstruction. No visible free air. BONES: Unremarkable. OTHER: None.       _______________                 Medications given in the ED-  Medications   droperidoL (INAPSINE) injection 1.25 mg (1.25 mg IntraVENous Given 10/15/21 1344)   pantoprazole (PROTONIX) injection 40 mg (40 mg IntraVENous Given 10/15/21 1343)   lactated ringers bolus infusion 1,000 mL (0 mL IntraVENous IV Completed 10/15/21 1452)   cefTRIAXone (ROCEPHIN) 1 g in sterile water (preservative free) 10 mL IV syringe (1 g IntraVENous Given 10/15/21 1447)   morphine injection 4 mg (4 mg IntraVENous Given 10/15/21 1448)   ondansetron (ZOFRAN) injection 4 mg (4 mg IntraVENous Given 10/15/21 1447)   diphenhydrAMINE (BENADRYL) injection 25 mg (25 mg IntraVENous Given 10/15/21 1447)         Medical Decision Making   I am the first provider for this patient. I reviewed the vital signs, available nursing notes, past medical history, past surgical history, family history and social history. Vital Signs-Reviewed the patient's vital signs. Pulse Oximetry Analysis - 99% on RA. NORMAL     Records Reviewed: Nursing Notes, Old Medical Records, Previous electrocardiograms, Previous Radiology Studies and Previous Laboratory Studies    Provider Notes (Medical Decision Making): food borne, viral, gastroenteritis, pancreatitis, hepatitis, GERD, PUD, gastritis, constipation, UTI, COVID. Doubt appy or diverticulitis given recent unremarkable CT scan    Procedures:  Procedures    ED Course:   1:05 PM Initial assessment performed. The patients presenting problems have been discussed, and they are in agreement with the care plan formulated and outlined with them. I have encouraged them to ask questions as they arise throughout their visit. Diagnosis and Disposition       Pt feels better. No emesis in ED. Labs rather unremarkable with UA c/w UTI with pyuria. IV rocephin given. Will await urine culture.  Doubt upper urinary tract involvement. Will tx with bactrim as apparently pt has been taking kelfex Rx from [facility] ED without improvement. Flat plate imaging unremarkable with very benign abdominal exam. Hepatitis viral panel ordered given mild transaminitis. Doubt need for another CT given multiple advanced imaging in 2-3 weeks with resolving seroma from CT. Consider CHS given habitual marijuana use with UDS +. Discussed need for cessation. Dickson diet. Sxs control. PPI. FU with PCP. SARS-COV-2 in process. .Reasons to RTED discussed with pt. All questions answered. Pt feels comfortable going home at this time. Pt expressed understanding and she agrees with plan. 1. Nausea and vomiting, intractability of vomiting not specified, unspecified vomiting type    2. Abdominal pain, epigastric    3. Acute cystitis without hematuria    4. Transaminitis    5. Marijuana use    6. Person under investigation for COVID-19        PLAN:  1. D/C Home  2. Discharge Medication List as of 10/15/2021  3:00 PM      START taking these medications    Details   trimethoprim-sulfamethoxazole (Bactrim DS) 160-800 mg per tablet Take 1 Tablet by mouth two (2) times a day for 7 days. Indications: bacterial urinary tract infection, Normal, Disp-14 Tablet, R-0      pantoprazole (Protonix) 40 mg tablet Take 1 Tablet by mouth daily for 20 days. Indications: gastroesophageal reflux disease, Normal, Disp-20 Tablet, R-0         CONTINUE these medications which have CHANGED    Details   ondansetron (Zofran ODT) 4 mg disintegrating tablet Take 1-2 Tablets by mouth every eight (8) hours as needed for Nausea (or vomiting.). Allow to dissolve on tongue, Normal, Disp-20 Tablet, R-0         CONTINUE these medications which have NOT CHANGED    Details   gabapentin (NEURONTIN) 100 mg capsule Take  by mouth three (3) times daily. , Historical Med      polyethylene glycol (MIRALAX) 17 gram/dose powder Take 17 g by mouth two (2) times a day.  1 tablespoon with 8 oz of water daily, Normal, Disp-238 g, R-0         STOP taking these medications       ciprofloxacin HCl (Cipro) 250 mg tablet Comments:   Reason for Stopping:             3.   Follow-up Information     Follow up With Specialties Details Why Contact Info    Adrian Polk MD Gastroenterology  follow up with gastroenterologist [address] [facility] [id number]  [phone],  Internal Medicine   [address] [facility] [address] [facility] [address] [phone]      [facility] EMERGENCY DEPT Emergency Medicine   [address] [place] [address] [address] [facility]  [phone]        _______________________________    Attestations: This note is prepared by Day Roach PA-C.  _______________________________          Please note that this dictation was completed with [employer], the computer voice recognition software. Quite often unanticipated grammatical, syntax, homophones, and other interpretive errors are inadvertently transcribed by the computer software. Please disregard these errors. Please excuse any errors that have escaped final proofreading.

## 2021-10-16 LAB
BACTERIA SPEC CULT: NORMAL
SARS-COV-2, COV2NT: NOT DETECTED
SERVICE CMNT-IMP: NORMAL

## 2021-10-18 LAB
HAV IGM SER QL: NEGATIVE
HBV CORE IGM SER QL: NEGATIVE
HBV SURFACE AG SER QL: <0.1 INDEX
HBV SURFACE AG SER QL: NEGATIVE
HCV AB SER IA-ACNC: >11 INDEX
HCV AB SERPL QL IA: POSITIVE
HCV COMMENT,HCGAC: ABNORMAL
SP1: ABNORMAL
SP2: ABNORMAL
SP3: ABNORMAL

## 2021-10-18 NOTE — CALL BACK NOTE
4:18 PM  Informed pt of +hep C AB testing and sent lab test result to her PCP for f/up as outpatient.  - alban, rolan